# Patient Record
Sex: MALE | Race: WHITE | NOT HISPANIC OR LATINO | Employment: OTHER
[De-identification: names, ages, dates, MRNs, and addresses within clinical notes are randomized per-mention and may not be internally consistent; named-entity substitution may affect disease eponyms.]

---

## 2023-02-20 ENCOUNTER — TRANSCRIBE ORDERS (OUTPATIENT)
Dept: OTHER | Age: 68
End: 2023-02-20

## 2023-02-20 DIAGNOSIS — I25.10 CAD, MULTIPLE VESSEL: Primary | ICD-10-CM

## 2023-05-30 ENCOUNTER — HOSPITAL ENCOUNTER (INPATIENT)
Facility: CLINIC | Age: 68
LOS: 3 days | Discharge: HOME | DRG: 287 | End: 2023-06-02
Attending: EMERGENCY MEDICINE | Admitting: STUDENT IN AN ORGANIZED HEALTH CARE EDUCATION/TRAINING PROGRAM
Payer: COMMERCIAL

## 2023-05-30 ENCOUNTER — TRANSFERRED RECORDS (OUTPATIENT)
Dept: HEALTH INFORMATION MANAGEMENT | Facility: CLINIC | Age: 68
End: 2023-05-30

## 2023-05-30 ENCOUNTER — APPOINTMENT (OUTPATIENT)
Dept: GENERAL RADIOLOGY | Facility: CLINIC | Age: 68
DRG: 287 | End: 2023-05-30
Attending: EMERGENCY MEDICINE
Payer: COMMERCIAL

## 2023-05-30 DIAGNOSIS — R79.89 ELEVATED TROPONIN: ICD-10-CM

## 2023-05-30 DIAGNOSIS — R07.9 CHEST PAIN, UNSPECIFIED TYPE: ICD-10-CM

## 2023-05-30 DIAGNOSIS — I10 HYPERTENSION, UNSPECIFIED TYPE: ICD-10-CM

## 2023-05-30 DIAGNOSIS — I21.4 NSTEMI (NON-ST ELEVATED MYOCARDIAL INFARCTION) (H): Primary | ICD-10-CM

## 2023-05-30 LAB
ANION GAP SERPL CALCULATED.3IONS-SCNC: 14 MMOL/L (ref 7–15)
BASOPHILS # BLD AUTO: 0.1 10E3/UL (ref 0–0.2)
BASOPHILS NFR BLD AUTO: 1 %
BUN SERPL-MCNC: 24.8 MG/DL (ref 8–23)
CALCIUM SERPL-MCNC: 9.3 MG/DL (ref 8.8–10.2)
CHLORIDE SERPL-SCNC: 104 MMOL/L (ref 98–107)
CREAT SERPL-MCNC: 1.12 MG/DL (ref 0.67–1.17)
DEPRECATED HCO3 PLAS-SCNC: 21 MMOL/L (ref 22–29)
EOSINOPHIL # BLD AUTO: 0.1 10E3/UL (ref 0–0.7)
EOSINOPHIL NFR BLD AUTO: 1 %
ERYTHROCYTE [DISTWIDTH] IN BLOOD BY AUTOMATED COUNT: 15.1 % (ref 10–15)
ERYTHROCYTE [DISTWIDTH] IN BLOOD BY AUTOMATED COUNT: 15.2 % (ref 10–15)
GFR SERPL CREATININE-BSD FRML MDRD: 71 ML/MIN/1.73M2
GLUCOSE SERPL-MCNC: 81 MG/DL (ref 70–99)
HCT VFR BLD AUTO: 41.6 % (ref 40–53)
HCT VFR BLD AUTO: 43 % (ref 40–53)
HGB BLD-MCNC: 14.2 G/DL (ref 13.3–17.7)
HGB BLD-MCNC: 14.7 G/DL (ref 13.3–17.7)
HOLD SPECIMEN: NORMAL
IMM GRANULOCYTES # BLD: 0 10E3/UL
IMM GRANULOCYTES NFR BLD: 0 %
INR PPP: 1.56 (ref 0.85–1.15)
LYMPHOCYTES # BLD AUTO: 1.7 10E3/UL (ref 0.8–5.3)
LYMPHOCYTES NFR BLD AUTO: 21 %
MCH RBC QN AUTO: 29.4 PG (ref 26.5–33)
MCH RBC QN AUTO: 29.5 PG (ref 26.5–33)
MCHC RBC AUTO-ENTMCNC: 34.1 G/DL (ref 31.5–36.5)
MCHC RBC AUTO-ENTMCNC: 34.2 G/DL (ref 31.5–36.5)
MCV RBC AUTO: 86 FL (ref 78–100)
MCV RBC AUTO: 86 FL (ref 78–100)
MONOCYTES # BLD AUTO: 0.7 10E3/UL (ref 0–1.3)
MONOCYTES NFR BLD AUTO: 8 %
NEUTROPHILS # BLD AUTO: 5.4 10E3/UL (ref 1.6–8.3)
NEUTROPHILS NFR BLD AUTO: 69 %
NRBC # BLD AUTO: 0 10E3/UL
NRBC BLD AUTO-RTO: 0 /100
PLATELET # BLD AUTO: 250 10E3/UL (ref 150–450)
PLATELET # BLD AUTO: 257 10E3/UL (ref 150–450)
POTASSIUM SERPL-SCNC: 3.9 MMOL/L (ref 3.4–5.3)
RBC # BLD AUTO: 4.83 10E6/UL (ref 4.4–5.9)
RBC # BLD AUTO: 4.98 10E6/UL (ref 4.4–5.9)
SODIUM SERPL-SCNC: 139 MMOL/L (ref 136–145)
TROPONIN T SERPL HS-MCNC: 21 NG/L
TROPONIN T SERPL HS-MCNC: 23 NG/L
WBC # BLD AUTO: 6.8 10E3/UL (ref 4–11)
WBC # BLD AUTO: 7.9 10E3/UL (ref 4–11)

## 2023-05-30 PROCEDURE — 99285 EMERGENCY DEPT VISIT HI MDM: CPT | Mod: 25

## 2023-05-30 PROCEDURE — 85027 COMPLETE CBC AUTOMATED: CPT | Performed by: STUDENT IN AN ORGANIZED HEALTH CARE EDUCATION/TRAINING PROGRAM

## 2023-05-30 PROCEDURE — 36415 COLL VENOUS BLD VENIPUNCTURE: CPT | Performed by: STUDENT IN AN ORGANIZED HEALTH CARE EDUCATION/TRAINING PROGRAM

## 2023-05-30 PROCEDURE — 99223 1ST HOSP IP/OBS HIGH 75: CPT | Mod: AI | Performed by: STUDENT IN AN ORGANIZED HEALTH CARE EDUCATION/TRAINING PROGRAM

## 2023-05-30 PROCEDURE — 36415 COLL VENOUS BLD VENIPUNCTURE: CPT | Performed by: EMERGENCY MEDICINE

## 2023-05-30 PROCEDURE — 85025 COMPLETE CBC W/AUTO DIFF WBC: CPT | Performed by: EMERGENCY MEDICINE

## 2023-05-30 PROCEDURE — 83735 ASSAY OF MAGNESIUM: CPT | Performed by: STUDENT IN AN ORGANIZED HEALTH CARE EDUCATION/TRAINING PROGRAM

## 2023-05-30 PROCEDURE — 120N000001 HC R&B MED SURG/OB

## 2023-05-30 PROCEDURE — 71046 X-RAY EXAM CHEST 2 VIEWS: CPT

## 2023-05-30 PROCEDURE — 85610 PROTHROMBIN TIME: CPT | Performed by: EMERGENCY MEDICINE

## 2023-05-30 PROCEDURE — 250N000011 HC RX IP 250 OP 636: Performed by: STUDENT IN AN ORGANIZED HEALTH CARE EDUCATION/TRAINING PROGRAM

## 2023-05-30 PROCEDURE — 93005 ELECTROCARDIOGRAM TRACING: CPT

## 2023-05-30 PROCEDURE — 84484 ASSAY OF TROPONIN QUANT: CPT | Performed by: EMERGENCY MEDICINE

## 2023-05-30 PROCEDURE — 82310 ASSAY OF CALCIUM: CPT | Performed by: EMERGENCY MEDICINE

## 2023-05-30 PROCEDURE — 96365 THER/PROPH/DIAG IV INF INIT: CPT

## 2023-05-30 RX ORDER — SODIUM CHLORIDE 9 MG/ML
INJECTION, SOLUTION INTRAVENOUS CONTINUOUS
Status: DISCONTINUED | OUTPATIENT
Start: 2023-05-30 | End: 2023-06-01

## 2023-05-30 RX ORDER — CLOPIDOGREL BISULFATE 75 MG/1
75 TABLET ORAL EVERY EVENING
COMMUNITY
Start: 2023-04-27

## 2023-05-30 RX ORDER — HEPARIN SODIUM 10000 [USP'U]/100ML
0-5000 INJECTION, SOLUTION INTRAVENOUS CONTINUOUS
Status: DISCONTINUED | OUTPATIENT
Start: 2023-05-30 | End: 2023-06-01

## 2023-05-30 RX ORDER — ATORVASTATIN CALCIUM 40 MG/1
40 TABLET, FILM COATED ORAL EVERY EVENING
COMMUNITY
Start: 2023-03-10

## 2023-05-30 RX ORDER — NALOXONE HYDROCHLORIDE 0.4 MG/ML
0.2 INJECTION, SOLUTION INTRAMUSCULAR; INTRAVENOUS; SUBCUTANEOUS
Status: DISCONTINUED | OUTPATIENT
Start: 2023-05-30 | End: 2023-06-02 | Stop reason: HOSPADM

## 2023-05-30 RX ORDER — ESCITALOPRAM OXALATE 10 MG/1
20 TABLET ORAL EVERY EVENING
Status: DISCONTINUED | OUTPATIENT
Start: 2023-05-30 | End: 2023-06-02 | Stop reason: HOSPADM

## 2023-05-30 RX ORDER — AMOXICILLIN 250 MG
2 CAPSULE ORAL 2 TIMES DAILY PRN
Status: DISCONTINUED | OUTPATIENT
Start: 2023-05-30 | End: 2023-06-02 | Stop reason: HOSPADM

## 2023-05-30 RX ORDER — HYDROMORPHONE HCL IN WATER/PF 6 MG/30 ML
0.2 PATIENT CONTROLLED ANALGESIA SYRINGE INTRAVENOUS
Status: DISCONTINUED | OUTPATIENT
Start: 2023-05-30 | End: 2023-06-02 | Stop reason: HOSPADM

## 2023-05-30 RX ORDER — AMOXICILLIN 250 MG
1 CAPSULE ORAL 2 TIMES DAILY PRN
Status: DISCONTINUED | OUTPATIENT
Start: 2023-05-30 | End: 2023-06-02 | Stop reason: HOSPADM

## 2023-05-30 RX ORDER — NALOXONE HYDROCHLORIDE 0.4 MG/ML
0.4 INJECTION, SOLUTION INTRAMUSCULAR; INTRAVENOUS; SUBCUTANEOUS
Status: DISCONTINUED | OUTPATIENT
Start: 2023-05-30 | End: 2023-06-02 | Stop reason: HOSPADM

## 2023-05-30 RX ORDER — PROCHLORPERAZINE MALEATE 5 MG
5 TABLET ORAL EVERY 6 HOURS PRN
Status: DISCONTINUED | OUTPATIENT
Start: 2023-05-30 | End: 2023-06-02 | Stop reason: HOSPADM

## 2023-05-30 RX ORDER — PROCHLORPERAZINE 25 MG
12.5 SUPPOSITORY, RECTAL RECTAL EVERY 12 HOURS PRN
Status: DISCONTINUED | OUTPATIENT
Start: 2023-05-30 | End: 2023-06-02 | Stop reason: HOSPADM

## 2023-05-30 RX ORDER — ESCITALOPRAM OXALATE 20 MG/1
20 TABLET ORAL EVERY EVENING
COMMUNITY
Start: 2023-05-19

## 2023-05-30 RX ORDER — ACETAMINOPHEN 650 MG/1
650 SUPPOSITORY RECTAL EVERY 6 HOURS PRN
Status: DISCONTINUED | OUTPATIENT
Start: 2023-05-30 | End: 2023-06-02 | Stop reason: HOSPADM

## 2023-05-30 RX ORDER — CLOPIDOGREL BISULFATE 75 MG/1
75 TABLET ORAL EVERY EVENING
Status: DISCONTINUED | OUTPATIENT
Start: 2023-05-30 | End: 2023-06-02 | Stop reason: HOSPADM

## 2023-05-30 RX ORDER — ONDANSETRON 2 MG/ML
4 INJECTION INTRAMUSCULAR; INTRAVENOUS EVERY 6 HOURS PRN
Status: DISCONTINUED | OUTPATIENT
Start: 2023-05-30 | End: 2023-06-02 | Stop reason: HOSPADM

## 2023-05-30 RX ORDER — NORTRIPTYLINE HCL 10 MG
20-30 CAPSULE ORAL AT BEDTIME
Status: DISCONTINUED | OUTPATIENT
Start: 2023-05-30 | End: 2023-06-02 | Stop reason: HOSPADM

## 2023-05-30 RX ORDER — ONDANSETRON 4 MG/1
4 TABLET, ORALLY DISINTEGRATING ORAL EVERY 6 HOURS PRN
Status: DISCONTINUED | OUTPATIENT
Start: 2023-05-30 | End: 2023-06-02 | Stop reason: HOSPADM

## 2023-05-30 RX ORDER — ACETAMINOPHEN 325 MG/1
650 TABLET ORAL EVERY 6 HOURS PRN
Status: DISCONTINUED | OUTPATIENT
Start: 2023-05-30 | End: 2023-06-02 | Stop reason: HOSPADM

## 2023-05-30 RX ORDER — METOPROLOL SUCCINATE 25 MG/1
25 TABLET, EXTENDED RELEASE ORAL EVERY EVENING
Status: DISCONTINUED | OUTPATIENT
Start: 2023-05-30 | End: 2023-06-02 | Stop reason: HOSPADM

## 2023-05-30 RX ORDER — WARFARIN SODIUM 5 MG/1
5 TABLET ORAL EVERY EVENING
COMMUNITY
Start: 2023-03-09

## 2023-05-30 RX ORDER — ACETAMINOPHEN 500 MG
500-1000 TABLET ORAL EVERY 6 HOURS PRN
COMMUNITY

## 2023-05-30 RX ORDER — NORTRIPTYLINE HCL 10 MG
30 CAPSULE ORAL AT BEDTIME
COMMUNITY
Start: 2023-05-19

## 2023-05-30 RX ORDER — ATORVASTATIN CALCIUM 40 MG/1
40 TABLET, FILM COATED ORAL EVERY EVENING
Status: DISCONTINUED | OUTPATIENT
Start: 2023-05-30 | End: 2023-06-02 | Stop reason: HOSPADM

## 2023-05-30 RX ORDER — LIDOCAINE 40 MG/G
CREAM TOPICAL
Status: DISCONTINUED | OUTPATIENT
Start: 2023-05-30 | End: 2023-06-02 | Stop reason: HOSPADM

## 2023-05-30 RX ORDER — METOPROLOL SUCCINATE 25 MG/1
25 TABLET, EXTENDED RELEASE ORAL EVERY EVENING
COMMUNITY
Start: 2023-05-08

## 2023-05-30 RX ADMIN — HEPARIN SODIUM 1100 UNITS/HR: 10000 INJECTION, SOLUTION INTRAVENOUS at 20:30

## 2023-05-30 ASSESSMENT — ACTIVITIES OF DAILY LIVING (ADL)
ADLS_ACUITY_SCORE: 35
ADLS_ACUITY_SCORE: 25
ADLS_ACUITY_SCORE: 35
ADLS_ACUITY_SCORE: 35

## 2023-05-30 NOTE — Clinical Note
The DP pulses are 2+ bilaterally. The PT pulses are 2+ bilaterally. The femoral pulses are 2+ bilaterally.

## 2023-05-30 NOTE — ED TRIAGE NOTES
Arrives via EMS for chest pain that started two hours PTA when putting in an AC unit. Recently had two stents placed two months ago. H/o afib, on thinners. Denies CP currently. Sent from the clinic. EKG from clinic NSR. VSS. ABCs intact. A/Ox4.

## 2023-05-30 NOTE — ED PROVIDER NOTES
History   Chief Complaint:  Chest Pain    HPI   José Antonio Marcano is a 68 year old male with history of lymphoblastic lymphoma, hypertension, hyperlipidemia, coronary artery disease four months s/p angiogram with PCI who presents to the emergency department for evaluation of chest pain onset two hours ago(1500). José Antonio explains that he was putting in an AC unit when he experienced sudden-onset chest pain. This episode of chest pain lasted for approximately one hour, which is longer than any previous episodes of chest pain. His pain his presently relieved. He did not take his nitroglycerin as he forgot it at home. Neither nitroglycerin nor aspirin [place] [facility] [place] or while en route. Denies headache, dyspnea beyond baseline, and upper or lower extremity abnormalities. Primary cardiologist is Dr. Bekah Ochoa MD, at the [facility]. He takes all his meds including cardiac daily and missed no medications today. José Antonio's primary occupation is in [employer].     Independent Historian:   None - Patient Only    Medications:    Coumadin   Plavix 02/17/2023-04/24/2023  Lovenox 02/17/2023-04/24/2023  Aspirin 81 MG  Atorvastatin   Lexapro  Metoprolol   Nitroglycerin   Nortriptyline     Past Medical History:    Hypertension  Hyperlipidemia  Inguinal hernia  Lymphoblastic lymphoma   TIA  Atrial fibrillation   Coronary artery disease     Past Surgical History:    Medtronic LINQ insertion   LUIS x 2 to LM-LAD and LM-Cx  PCI  Coronary angiogram     Physical Exam     Patient Vitals for the past 24 hrs:   BP Temp Temp src Pulse Resp SpO2   05/30/23 1901 (!) 139/92 -- -- 73 12 98 %   05/30/23 1741 -- -- -- 69 11 98 %   05/30/23 1644 (!) 133/100 -- -- 74 17 96 %   05/30/23 1610 (!) 138/102 -- -- 89 13 100 %   05/30/23 1555 (!) 167/117 98.1  F (36.7  C) Temporal 80 18 100 %     Physical Exam  Constitutional: Patient is well appearing. No distress.  Head: Atraumatic.  Eyes: Conjunctivae are  normal. No scleral icterus.  Neck: Normal range of motion. Neck supple.   Cardiovascular: Normal rate, regular rhythm, normal heart sounds and intact distal perfusion.   Pulmonary/Chest: Breath sounds normal. No respiratory distress.  Abdominal: Soft. Bowel sounds are normal. No distension. No tenderness. No rebound or guarding.   Musculoskeletal: Normal range of motion. No edema or tenderness.   Neurological: Alert and orientated to person, place, and time. No observable focal neuro deficit  Skin: Warm and dry. No rash noted. Not diaphoretic.     Emergency Department Course   ECG  ECG taken at 1601, ECG read at 1613  Sinus rhythm with marked sinus arrhythmia   Nonspecific ST abnormality   Prolonged QT   Abnormal ECG   Rate 85 bpm. SD interval 116 ms. QRS duration 96 ms. QT/QTc 412/490 ms. P-R-T axes 42 -17 59.     Imaging:  XR Chest 2 Views   Final Result   IMPRESSION:       The cardiac silhouette is normal in size. Left coronary stent. Loop recorder in the left anterior chest wall. Hilar and mediastinal interfaces are normal.      The lungs are symmetrically inflated. There are no airspace or interstitial opacities.      Diaphragm curvature is normal. No pleural fluid is present.      Flowing degenerative osteophytes mid and lower thoracic spine. No aggressive or destructive bone lesions.             Report per radiology    Laboratory:  Labs Ordered and Resulted from Time of ED Arrival to Time of ED Departure   BASIC METABOLIC PANEL - Abnormal       Result Value    Sodium 139      Potassium 3.9      Chloride 104      Carbon Dioxide (CO2) 21 (*)     Anion Gap 14      Urea Nitrogen 24.8 (*)     Creatinine 1.12      Calcium 9.3      Glucose 81      GFR Estimate 71     CBC WITH PLATELETS AND DIFFERENTIAL - Abnormal    WBC Count 7.9      RBC Count 4.98      Hemoglobin 14.7      Hematocrit 43.0      MCV 86      MCH 29.5      MCHC 34.2      RDW 15.1 (*)     Platelet Count 257      % Neutrophils 69      % Lymphocytes 21       % Monocytes 8      % Eosinophils 1      % Basophils 1      % Immature Granulocytes 0      NRBCs per 100 WBC 0      Absolute Neutrophils 5.4      Absolute Lymphocytes 1.7      Absolute Monocytes 0.7      Absolute Eosinophils 0.1      Absolute Basophils 0.1      Absolute Immature Granulocytes 0.0      Absolute NRBCs 0.0     INR - Abnormal    INR 1.56 (*)    TROPONIN T, HIGH SENSITIVITY - Abnormal    Troponin T, High Sensitivity 23 (*)    TROPONIN T, HIGH SENSITIVITY - Normal    Troponin T, High Sensitivity 21       Emergency Department Course & Assessments:    Interventions:  Medications - No data to display     Assessments:  1622 I obtained history and examined the patient as noted above.   1917 I rechecked the patient and explained findings. I discussed admission with the patient. All questions answered.     Independent Interpretation (X-rays, CTs, rhythm strip):  None    Consultations/Discussion of Management or Tests:  ED Course as of 05/30/23 1934   Tue May 30, 2023   1926 I consulted with Dr. Monsalve, hospitalist, regarding the patient's history and presentation here in the emergency department who accepted the patient for admission.     Social Determinants of Health affecting care:   None    Disposition:  The patient was admitted to the hospital under the care of Dr. Monsalve .     Impression & Plan      Medical Decision Making:  This patient presents to the emergency department today with chest pain. Initial imaging tests has come back normal. Delta trop was positive. INR is not within therapeutic ranges. My clinical suspicion of acute coronary syndrome is high enough to warrant further therapy and investigation. I will admit the patient to medicine service for further workup. The patient is pain free upon emergency department arrival. There is no clinical, laboratory, or radiographic evidence of pulmonary embolism, AAA, aortic dissection, pneumonia or pneumothorax. The patient agrees to be admitted and all  questions were answered.      Diagnosis:    ICD-10-CM    1. Chest pain, unspecified type  R07.9       2. Elevated troponin  R77.8         Scribe Disclosure:  I, Erlinda Lam, am serving as a scribe at 4:21 PM on 5/30/2023 to document services personally performed by Yury Moses MD based on my observations and the provider's statements to me.    5/30/2023   Yury Moses MD Stevens, Andrew C, MD  05/30/23 2012

## 2023-05-31 ENCOUNTER — APPOINTMENT (OUTPATIENT)
Dept: CARDIOLOGY | Facility: CLINIC | Age: 68
DRG: 287 | End: 2023-05-31
Attending: STUDENT IN AN ORGANIZED HEALTH CARE EDUCATION/TRAINING PROGRAM
Payer: COMMERCIAL

## 2023-05-31 LAB
ANION GAP SERPL CALCULATED.3IONS-SCNC: 11 MMOL/L (ref 7–15)
ATRIAL RATE - MUSE: 54 BPM
ATRIAL RATE - MUSE: 85 BPM
BI-PLANE LVEF ECHO: NORMAL
BUN SERPL-MCNC: 23.1 MG/DL (ref 8–23)
CALCIUM SERPL-MCNC: 9.2 MG/DL (ref 8.8–10.2)
CHLORIDE SERPL-SCNC: 104 MMOL/L (ref 98–107)
CHOLEST SERPL-MCNC: 287 MG/DL
CREAT SERPL-MCNC: 0.93 MG/DL (ref 0.67–1.17)
DEPRECATED HCO3 PLAS-SCNC: 23 MMOL/L (ref 22–29)
DIASTOLIC BLOOD PRESSURE - MUSE: NORMAL MMHG
DIASTOLIC BLOOD PRESSURE - MUSE: NORMAL MMHG
ERYTHROCYTE [DISTWIDTH] IN BLOOD BY AUTOMATED COUNT: 15.5 % (ref 10–15)
GFR SERPL CREATININE-BSD FRML MDRD: 88 ML/MIN/1.73M2
GLUCOSE BLDC GLUCOMTR-MCNC: 84 MG/DL (ref 70–99)
GLUCOSE SERPL-MCNC: 103 MG/DL (ref 70–99)
HCT VFR BLD AUTO: 43.2 % (ref 40–53)
HDLC SERPL-MCNC: 68 MG/DL
HGB BLD-MCNC: 14.4 G/DL (ref 13.3–17.7)
INTERPRETATION ECG - MUSE: NORMAL
INTERPRETATION ECG - MUSE: NORMAL
LDLC SERPL CALC-MCNC: 198 MG/DL
MAGNESIUM SERPL-MCNC: 2 MG/DL (ref 1.7–2.3)
MAGNESIUM SERPL-MCNC: 2.1 MG/DL (ref 1.7–2.3)
MCH RBC QN AUTO: 29.6 PG (ref 26.5–33)
MCHC RBC AUTO-ENTMCNC: 33.3 G/DL (ref 31.5–36.5)
MCV RBC AUTO: 89 FL (ref 78–100)
NONHDLC SERPL-MCNC: 219 MG/DL
P AXIS - MUSE: 42 DEGREES
P AXIS - MUSE: 50 DEGREES
PLATELET # BLD AUTO: 229 10E3/UL (ref 150–450)
POTASSIUM SERPL-SCNC: 4.2 MMOL/L (ref 3.4–5.3)
PR INTERVAL - MUSE: 116 MS
PR INTERVAL - MUSE: 150 MS
QRS DURATION - MUSE: 96 MS
QRS DURATION - MUSE: 96 MS
QT - MUSE: 412 MS
QT - MUSE: 490 MS
QTC - MUSE: 464 MS
QTC - MUSE: 490 MS
R AXIS - MUSE: -17 DEGREES
R AXIS - MUSE: -19 DEGREES
RBC # BLD AUTO: 4.86 10E6/UL (ref 4.4–5.9)
SODIUM SERPL-SCNC: 138 MMOL/L (ref 136–145)
SYSTOLIC BLOOD PRESSURE - MUSE: NORMAL MMHG
SYSTOLIC BLOOD PRESSURE - MUSE: NORMAL MMHG
T AXIS - MUSE: 47 DEGREES
T AXIS - MUSE: 59 DEGREES
TRIGL SERPL-MCNC: 105 MG/DL
TROPONIN T SERPL HS-MCNC: 23 NG/L
TROPONIN T SERPL HS-MCNC: 25 NG/L
UFH PPP CHRO-ACNC: 0.16 IU/ML
UFH PPP CHRO-ACNC: 0.28 IU/ML
UFH PPP CHRO-ACNC: 0.37 IU/ML
UFH PPP CHRO-ACNC: 0.52 IU/ML
VENTRICULAR RATE- MUSE: 54 BPM
VENTRICULAR RATE- MUSE: 85 BPM
WBC # BLD AUTO: 5 10E3/UL (ref 4–11)

## 2023-05-31 PROCEDURE — 36415 COLL VENOUS BLD VENIPUNCTURE: CPT | Performed by: HOSPITALIST

## 2023-05-31 PROCEDURE — 250N000011 HC RX IP 250 OP 636: Performed by: STUDENT IN AN ORGANIZED HEALTH CARE EDUCATION/TRAINING PROGRAM

## 2023-05-31 PROCEDURE — 80061 LIPID PANEL: CPT | Performed by: STUDENT IN AN ORGANIZED HEALTH CARE EDUCATION/TRAINING PROGRAM

## 2023-05-31 PROCEDURE — 85027 COMPLETE CBC AUTOMATED: CPT | Performed by: STUDENT IN AN ORGANIZED HEALTH CARE EDUCATION/TRAINING PROGRAM

## 2023-05-31 PROCEDURE — 80048 BASIC METABOLIC PNL TOTAL CA: CPT | Performed by: STUDENT IN AN ORGANIZED HEALTH CARE EDUCATION/TRAINING PROGRAM

## 2023-05-31 PROCEDURE — 120N000001 HC R&B MED SURG/OB

## 2023-05-31 PROCEDURE — 250N000013 HC RX MED GY IP 250 OP 250 PS 637

## 2023-05-31 PROCEDURE — 99233 SBSQ HOSP IP/OBS HIGH 50: CPT | Performed by: HOSPITALIST

## 2023-05-31 PROCEDURE — 250N000013 HC RX MED GY IP 250 OP 250 PS 637: Performed by: STUDENT IN AN ORGANIZED HEALTH CARE EDUCATION/TRAINING PROGRAM

## 2023-05-31 PROCEDURE — 999N000208 ECHOCARDIOGRAM COMPLETE

## 2023-05-31 PROCEDURE — 83735 ASSAY OF MAGNESIUM: CPT | Performed by: STUDENT IN AN ORGANIZED HEALTH CARE EDUCATION/TRAINING PROGRAM

## 2023-05-31 PROCEDURE — 85520 HEPARIN ASSAY: CPT | Performed by: STUDENT IN AN ORGANIZED HEALTH CARE EDUCATION/TRAINING PROGRAM

## 2023-05-31 PROCEDURE — 36415 COLL VENOUS BLD VENIPUNCTURE: CPT | Performed by: STUDENT IN AN ORGANIZED HEALTH CARE EDUCATION/TRAINING PROGRAM

## 2023-05-31 PROCEDURE — 84484 ASSAY OF TROPONIN QUANT: CPT | Performed by: STUDENT IN AN ORGANIZED HEALTH CARE EDUCATION/TRAINING PROGRAM

## 2023-05-31 PROCEDURE — 250N000013 HC RX MED GY IP 250 OP 250 PS 637: Performed by: INTERNAL MEDICINE

## 2023-05-31 PROCEDURE — 93306 TTE W/DOPPLER COMPLETE: CPT | Mod: 26 | Performed by: INTERNAL MEDICINE

## 2023-05-31 PROCEDURE — 99223 1ST HOSP IP/OBS HIGH 75: CPT | Mod: 25 | Performed by: INTERNAL MEDICINE

## 2023-05-31 PROCEDURE — 93010 ELECTROCARDIOGRAM REPORT: CPT | Performed by: INTERNAL MEDICINE

## 2023-05-31 PROCEDURE — 85520 HEPARIN ASSAY: CPT | Performed by: HOSPITALIST

## 2023-05-31 PROCEDURE — 258N000003 HC RX IP 258 OP 636: Performed by: STUDENT IN AN ORGANIZED HEALTH CARE EDUCATION/TRAINING PROGRAM

## 2023-05-31 PROCEDURE — 255N000002 HC RX 255 OP 636: Performed by: STUDENT IN AN ORGANIZED HEALTH CARE EDUCATION/TRAINING PROGRAM

## 2023-05-31 RX ORDER — LOSARTAN POTASSIUM 25 MG/1
25 TABLET ORAL DAILY
Status: DISCONTINUED | OUTPATIENT
Start: 2023-05-31 | End: 2023-06-02 | Stop reason: HOSPADM

## 2023-05-31 RX ORDER — ASPIRIN 325 MG
325 TABLET ORAL ONCE
Status: COMPLETED | OUTPATIENT
Start: 2023-05-31 | End: 2023-05-31

## 2023-05-31 RX ORDER — LIDOCAINE 40 MG/G
CREAM TOPICAL
Status: DISCONTINUED | OUTPATIENT
Start: 2023-05-31 | End: 2023-06-01 | Stop reason: HOSPADM

## 2023-05-31 RX ORDER — LORAZEPAM 0.5 MG/1
TABLET ORAL
Status: COMPLETED
Start: 2023-05-31 | End: 2023-05-31

## 2023-05-31 RX ORDER — ASPIRIN 81 MG/1
243 TABLET, CHEWABLE ORAL ONCE
Status: COMPLETED | OUTPATIENT
Start: 2023-05-31 | End: 2023-05-31

## 2023-05-31 RX ORDER — LORAZEPAM 0.5 MG/1
0.5 TABLET ORAL
Status: DISCONTINUED | OUTPATIENT
Start: 2023-05-31 | End: 2023-06-01 | Stop reason: HOSPADM

## 2023-05-31 RX ORDER — LORAZEPAM 2 MG/ML
0.5 INJECTION INTRAMUSCULAR
Status: DISCONTINUED | OUTPATIENT
Start: 2023-05-31 | End: 2023-06-01 | Stop reason: HOSPADM

## 2023-05-31 RX ORDER — POTASSIUM CHLORIDE 1500 MG/1
20 TABLET, EXTENDED RELEASE ORAL
Status: DISCONTINUED | OUTPATIENT
Start: 2023-05-31 | End: 2023-06-01 | Stop reason: HOSPADM

## 2023-05-31 RX ADMIN — SODIUM CHLORIDE: 9 INJECTION, SOLUTION INTRAVENOUS at 14:40

## 2023-05-31 RX ADMIN — CLOPIDOGREL BISULFATE 75 MG: 75 TABLET ORAL at 20:06

## 2023-05-31 RX ADMIN — LORAZEPAM 0.5 MG: 0.5 TABLET ORAL at 16:21

## 2023-05-31 RX ADMIN — HEPARIN SODIUM 900 UNITS/HR: 10000 INJECTION, SOLUTION INTRAVENOUS at 16:38

## 2023-05-31 RX ADMIN — ASPIRIN 325 MG ORAL TABLET 325 MG: 325 PILL ORAL at 13:38

## 2023-05-31 RX ADMIN — HEPARIN SODIUM 1100 UNITS/HR: 10000 INJECTION, SOLUTION INTRAVENOUS at 13:41

## 2023-05-31 RX ADMIN — ESCITALOPRAM OXALATE 20 MG: 10 TABLET ORAL at 20:06

## 2023-05-31 RX ADMIN — ATORVASTATIN CALCIUM 40 MG: 40 TABLET, FILM COATED ORAL at 20:06

## 2023-05-31 RX ADMIN — CLOPIDOGREL BISULFATE 75 MG: 75 TABLET ORAL at 01:08

## 2023-05-31 RX ADMIN — ESCITALOPRAM OXALATE 20 MG: 10 TABLET ORAL at 01:07

## 2023-05-31 RX ADMIN — HEPARIN SODIUM 1050 UNITS/HR: 10000 INJECTION, SOLUTION INTRAVENOUS at 22:45

## 2023-05-31 RX ADMIN — METOPROLOL SUCCINATE 25 MG: 25 TABLET, EXTENDED RELEASE ORAL at 01:08

## 2023-05-31 RX ADMIN — NORTRIPTYLINE HYDROCHLORIDE 20 MG: 10 CAPSULE ORAL at 20:06

## 2023-05-31 RX ADMIN — NORTRIPTYLINE HYDROCHLORIDE 20 MG: 10 CAPSULE ORAL at 01:07

## 2023-05-31 RX ADMIN — ATORVASTATIN CALCIUM 40 MG: 40 TABLET, FILM COATED ORAL at 01:08

## 2023-05-31 RX ADMIN — HUMAN ALBUMIN MICROSPHERES AND PERFLUTREN 4 ML: 10; .22 INJECTION, SOLUTION INTRAVENOUS at 09:47

## 2023-05-31 ASSESSMENT — ACTIVITIES OF DAILY LIVING (ADL)
ADLS_ACUITY_SCORE: 25

## 2023-05-31 NOTE — PLAN OF CARE
Care from [id number]      Admit Date: 5/30/23  Admitting Diagnosis: elevated trop, chest pain  Pertinent History:     Isolation Precautions:   none.    Neuro: Alert and Oriented x4  Activity: are SBA with IV pole  Telemetry Monitoring: Yes - bradycardic  Pain: denying pain and chest pain.  Labs / Tests: troponin: 23, 25. Hep Xa: 0200: 0.28 no adjustment needed. Recheck scheduled for 0846.  GI: Bowel sounds audible.  : Voids spontaneously ambulating to the bathroom  LDA's: Peripheral x2  Fluids: left PIV Infusing. Right PIV SL  Diet: NPO  Consults: Cardiology  Discharge Disposition: TBD

## 2023-05-31 NOTE — H&P
[facility]    History and Physical - Hospitalist Service       Date of Admission:  5/30/2023    Assessment & Plan      José Antonio Marcano is a 68 year old male admitted on 5/30/2023.    68 y.o. male with a history of CAD, uncontrolled dyslipidemia, multiple CVA (on warfarin), paroxysmal atrial fibrillation, aortic valve sclerosis who underwent staged PCI on 2/17/2023 with LUIS x 2 to LM-LAD and LM-Cx.    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  He did not have his nitroglycerin with him.  It is not associated with any nausea, vomiting, diaphoresis or shortness of breath.  He went to see his doctor and was transferred to the ER by ambulance.  Currently he is chest pain-free.    Vitals on presentation: Temperature 98.1, heart rate of 80, blood pressure 167/117, respiratory rate of 18 and oxygen saturation of 100%. EKG showed sinus rhythm with sinus arrhythmia, first troponin was 21 and on recheck it was 23.  Rest of the CBC and BMP were unremarkable.  INR was 1.56.      1. Chest pain    Given high risk patient with recent PCI, concerning chest pain and slight uptrend in troponin, will treat as NSTEMI with heparin drip.  Consult cardiology in the morning.    Continue Plavix, not on aspirin due to being on Coumadin.    2. History of atrial fibrillation.    Currently in sinus rhythm with sinus arrhythmia.    INR subtherapeutic at 1.56, will be on heparin drip.    3. Essential hypertension.    Continue metoprolol.    4. Uncontrolled hyperlipidemia.    Continue Lipitor.  He has been prescribed Repatha but has not been taking it because he could not afford it.    Check lipid panel in the morning.    5. Depression/anxiety    Continue nortriptyline and escitalopram.     Diet:  Cardiac diet, n.p.o. at midnight  DVT Prophylaxis: Heparin drip  K[place]land Catheter: Not present  Lines: None     Cardiac Monitoring: None  Code Status:    full code    Clinically Significant Risk Factors Present on Admission               # Coagulation Defect: INR = 1.56 (Ref range: 0.85 - 1.15) and/or PTT = N/A, will monitor for bleeding    # Hypertension: Noted on problem list               Disposition Plan      Expected Discharge Date: 06/01/2023                  Rojelio Monsalve MD  Hospitalist Service  [facility]  Securely message with [employer] (more info)  Text page via [facility] Paging/Directory     ______________________________________________________________________    Chief Complaint   CP    History is obtained from the patient    History of Present Illness   José Antonio Marcano is a 68 year old male admitted on 5/30/2023.    68 y.o. male with a history of CAD, uncontrolled dyslipidemia, multiple CVA (on warfarin), paroxysmal atrial fibrillation, aortic valve sclerosis who underwent staged PCI on 2/17/2023 with LUIS x 2 to LM-LAD and LM-Cx.    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  He did not have his nitroglycerin with him.  It is not associated with any nausea, vomiting, diaphoresis or shortness of breath.  He went to see his doctor and was transferred to the ER by ambulance.  Currently he is chest pain-free.    Vitals on presentation: Temperature 98.1, heart rate of 80, blood pressure 167/117, respiratory rate of 18 and oxygen saturation of 100%. EKG showed sinus rhythm with sinus arrhythmia, first troponin was 21 and on recheck it was 23.  Rest of the CBC and BMP were unremarkable.  INR was 1.56.      Past Medical History    Past Medical History:   Diagnosis Date     HTN (Hypertension)      Hyperlipidemia LDL Goal <130      Inguinal Hernia      Lymphoma, Lymphoblastic (H)     had chemo       Past Surgical History   No past surgical history on file.    Prior to Admission Medications   None      Physical Exam   Vital Signs: Temp: 98.1  F  (36.7  C) Temp src: Temporal BP: (!) 139/92 Pulse: 73   Resp: 12 SpO2: 98 % O2 Device: None (Room air)    Weight: 0 lbs 0 oz        Medical Decision Making       MANAGEMENT DISCUSSED with the following over the past 24 hours: Patient, his wife and ER provider.       Data   Imaging results reviewed over the past 24 hrs:   Recent Results (from the past 24 hour(s))   XR Chest 2 Views    Narrative    EXAM: XR CHEST 2 VIEWS  LOCATION: [facility]  DATE/TIME: 5/30/2023 4:42 PM CDT    INDICATION: Chest pain  COMPARISON: None.      Impression    IMPRESSION:     The cardiac silhouette is normal in size. Left coronary stent. Loop recorder in the left anterior chest wall. Hilar and mediastinal interfaces are normal.    The lungs are symmetrically inflated. There are no airspace or interstitial opacities.    Diaphragm curvature is normal. No pleural fluid is present.    Flowing degenerative osteophytes mid and lower thoracic spine. No aggressive or destructive bone lesions.        Recent Labs   Lab 05/30/23  2018 05/30/23  1602   WBC 6.8 7.9   HGB 14.2 14.7   MCV 86 86    257   INR  --  1.56*   NA  --  139   POTASSIUM  --  3.9   CHLORIDE  --  104   CO2  --  21*   BUN  --  24.8*   CR  --  1.12   ANIONGAP  --  14   NOEL  --  9.3   GLC  --  81

## 2023-05-31 NOTE — CONSULTS
[facility]    Cardiology Consultation     José Antonio Marcano MRN#: [id number]   YOB: 1955 Age: 68 year old     Date of Admission:  5/30/2023    Consult Indication:  Elevated troponin    Assessment & Plan     # NSTEMI, troponin mildly elevated, known CAD with complex PCI with bifurcation stenting in LM to LAD/LCx, known RCA . Clinical presentation not consistent with PE or acute aortic syndrome, has known moderate ascending aortic dilatation 4.5cm, BP left arm 122/81 mmHg, right arm 116/79 mmHg, chest pain quality is same as prior anginal pain. Ddx includes demand ischemia from known CAD with RCA .   # HFrEF, LVEF 44% with apical hypokinesis. Euvolemic.   # PAF  # Amish status, no blood transfusions    - Discussed options for further evaluation and management including conservative medical management with close follow up, non-invasive stress testing, or cardiac catheterization/coronary angiography.  Reviewed the risks and benefits of each option at length.   - Recommend  cardiac catheterization/coronary angiography. If PCI needed, recommend transfer to [facility] or [place].   - Discussed the risks of cardiac catheterization/angiography +/- PCI that include but are not limited to the risk of stroke, heart attack, death, cardiac injury, emergent intervention such as stenting or bypass, contrast induced allergic reaction, renal dysfunction (including risks of temporary or permanent dialysis), and complications related to sedation and respiratory/pulmonary compromise, vascular complications (including bleeding and blood transfusion). Patient denies any major active bleeding issues and is willing to take and comply with dual antiplatelet therapy and understands the associated bleeding risks. Discussed bleeding risk in relation to Amish status increases overall procedural risk of morbidity/mortality.  Patient understands the overall risks of the procedure and wishes  "to proceed.  - Discussed with Pt's outpatient cardiologist, Dr. Ochoa, who was in agreement with diagnostic coronary angiogram  - continue home regimen of clopidogrel, metoprolol succinate, atorvastatin, warfarin (INR 1.56)  - cardiology will follow    Please do not hesitate to page with any questions or concerns.     Manuel Sahni MD, [facility]  Cardiology  May 31, 2023    Voice recognition software utilized.   High complexity     History of Present Illness     Patient is a 68-year-old male with a past medical history significant for dyslipidemia, multiple CVA (on warfarin), paroxysmal atrial fibrillation, coronary artery disease with complex PCI, who presents with chest pain.    Patient is followed by Dr. Oleary with [facility].  Patient has been experiencing exertional angina and on 1/27/2023 underwent cardiac catheterization/coronary angiography which demonstrated multivessel coronary artery disease with moderate left main disease, moderate to significant proximal left circumflex disease, severe LAD disease,  of RCA.  CABG was offered and was declined, patient is a [employer], there were concerns regarding blood loss from surgery.  On 2/17/2023 patient underwent complex PCI with complex bifurcation stenting of the left main into LAD and left main into the left circumflex artery.     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 This was similar to his pain leading up to his angiogram and stenting 1/2023. This pain lasted for about 30 minutes before subsiding. Denies radiation to his shoulder blades, no \"tearing\" sensation. He presented to the ED for further evaluation/management.     In the ED initial blood pressure was 167/117 mmHg.  ECG demonstrates sinus rhythm at 85 bpm with sinus arrhythmia, nonspecific ST changes, QTc 490 ms.  Initial labs notable for potassium 3.9, creatinine 1.12, hemoglobin 14.7.  INR 1.56.  " Initial troponin 21, peaked at 25, last value 23.    Last TTE through [facility] 4/17/2023 reported LVEF 50 to 55%, normal RV function, no significant valvular abnormalities.    TTE 5/31/2023 LVEF 44% with apical anterior, apical septal, apex hypokinesis. Ascending aorta 4.5cm, stable from prior reports.       Past Medical History   Past Medical History:   Diagnosis Date     HTN (Hypertension)      Hyperlipidemia LDL Goal <130      Inguinal Hernia      Lymphoma, Lymphoblastic (H)     had chemo       Past Surgical History   No past surgical history on file.    Prior to Admission Medications   Prior to Admission Medications   Prescriptions Last Dose Informant Patient Reported? Taking?   acetaminophen (TYLENOL) 500 MG tablet prn at prn  Yes Yes   Sig: Take 500-1,000 mg by mouth every 6 hours as needed for mild pain   atorvastatin (LIPITOR) 40 MG tablet 5/29/2023 at pm  Yes Yes   Sig: Take 40 mg by mouth every evening   clopidogrel (PLAVIX) 75 MG tablet 5/29/2023 at pm  Yes Yes   Sig: Take 75 mg by mouth every evening   escitalopram (LEXAPRO) 20 MG tablet 5/29/2023 at pm  Yes Yes   Sig: Take 20 mg by mouth every evening   metoprolol succinate ER (TOPROL XL) 25 MG 24 hr tablet 5/29/2023 at pm  Yes Yes   Sig: Take 25 mg by mouth every evening   nortriptyline (PAMELOR) 10 MG capsule 5/29/2023 at hs  Yes Yes   Sig: Take 30 mg by mouth At Bedtime   warfarin ANTICOAGULANT (COUMADIN) 5 MG tablet 5/29/2023 at pm  Yes Yes   Sig: Take 5 mg by mouth every evening      Facility-Administered Medications: None     Current Facility-Administered Medications   Medication Dose Route Frequency     atorvastatin  40 mg Oral QPM     clopidogrel  75 mg Oral QPM     escitalopram  20 mg Oral QPM     metoprolol succinate ER  25 mg Oral QPM     nortriptyline  20-30 mg Oral At Bedtime     sodium chloride (PF)  3 mL Intracatheter Q8H     Current Facility-Administered Medications   Medication Last Rate     heparin 1,100 Units/hr (05/31/23 0736)     -  MEDICATION INSTRUCTIONS -       sodium chloride       Allergies   No Known Allergies    Social History    reports that he has been smoking cigarettes. He has been smoking an average of .5 packs per day. He does not have any smokeless tobacco history on file. He reports current alcohol use.           Review of Systems   A comprehensive review of system was performed and is negative other than that noted in the HPI or here.     Physical Exam   Vital Signs with Ranges  Temp:  [97.9  F (36.6  C)-98.3  F (36.8  C)] 97.9  F (36.6  C)  Pulse:  [] 60  Resp:  [11-18] 18  BP: (125-167)/() 125/73  SpO2:  [95 %-100 %] 95 %  Wt Readings from Last 4 Encounters:   23 91.1 kg (200 lb 13.4 oz)   09 81.3 kg (179 lb 4.8 oz)     No intake/output data recorded.    Vital signs were personally reviewed:  Temperatures:  Current - Temp: 97.9  F (36.6  C); Max - Temp  Av.1  F (36.7  C)  Min: 97.9  F (36.6  C)  Max: 98.3  F (36.8  C)  Respiration range: Resp  Avg: 15.5  Min: 11  Max: 18  Pulse range: Pulse  Av.3  Min: 60  Max: 114  Blood pressure range: Systolic (24hrs), Av , Min:125 , Max:167   ; Diastolic (24hrs), Av, Min:73, Max:117    Pulse oximetry range: SpO2  Av.2 %  Min: 95 %  Max: 100 %  No intake or output data in the 24 hours ending 23 0846  200 lbs 13.42 oz  Body mass index is 27.24 kg/m .   Body surface area is 2.15 meters squared.    Physical Exam:   General/Constitutional: appears stated age, in no apparent distress, appears to be well nourished  Head: normocephalic, atraumatic  Eyes - No scleral icterus  Neck: supple, trachea midline  Respiratory: clear to auscultation bilaterally, no wheezes, no rales, no increased work of breathing  Cardiovascular: JVP normal, regular rate, regular rhythm, normal S1 and S2, no S3, S4, 1/6 RAYNA at the RUSB, no lower extremity edema  Gastrointestinal: no guarding, non-rigid   Neurologic: awake, alert, moves all extremities  Skin: no  jaundice, warm on limited exam  Psychiatric: affect is normal, answers questions appropriately, oriented to self and place    Laboratory tests personally reviewed:   CMP  Recent Labs   Lab 05/31/23  0648 05/30/23  1828 05/30/23  1602     --  139   POTASSIUM 4.2  --  3.9   CHLORIDE 104  --  104   CO2 23  --  21*   ANIONGAP 11  --  14   *  --  81   BUN 23.1*  --  24.8*   CR 0.93  --  1.12   GFRESTIMATED 88  --  71   NOEL 9.2  --  9.3   MAG 2.1 2.0  --      CBC  Recent Labs   Lab 05/31/23  0648 05/30/23 2018 05/30/23  1602   WBC 5.0 6.8 7.9   RBC 4.86 4.83 4.98   HGB 14.4 14.2 14.7   HCT 43.2 41.6 43.0   MCV 89 86 86   MCH 29.6 29.4 29.5   MCHC 33.3 34.1 34.2   RDW 15.5* 15.2* 15.1*    250 257     INR  Recent Labs   Lab 05/30/23  1602   INR 1.56*   Clinically Significant Risk Factors Present on Admission               # Drug Induced Coagulation Defect: home medication list includes an anticoagulant medication  # Drug Induced Platelet Defect: home medication list includes an antiplatelet medication   # Hypertension: Noted on problem list      # Overweight: Estimated body mass index is 27.24 kg/m  as calculated from the following:    Height as of this encounter: 1.829 m (6').    Weight as of this encounter: 91.1 kg (200 lb 13.4 oz).

## 2023-05-31 NOTE — PROGRESS NOTES
[facility]    Hospitalist Progress Note    Date of Service (when I saw the patient): 05/31/2023  Provider:  Kaiser Hoyos MD   Text Page  7am - 6PM       Assessment & Plan   José Antonio Marcano is a 68 year old male admitted on 5/30/2023.     68 y.o. male with a history of CAD, uncontrolled dyslipidemia, multiple CVA (on warfarin), paroxysmal atrial fibrillation, aortic valve sclerosis who underwent staged PCI on 2/17/2023 with LUIS x 2 to LM-LAD and LM-Cx.     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  He did not have his nitroglycerin with him.  It is not associated with any nausea, vomiting, diaphoresis or shortness of breath.  He went to see his doctor and was transferred to the ER by ambulance.  Currently he is chest pain-free.     Vitals on presentation: Temperature 98.1, heart rate of 80, blood pressure 167/117, respiratory rate of 18 and oxygen saturation of 100%. EKG showed sinus rhythm with sinus arrhythmia, first troponin was 21 and on recheck it was 23.  Rest of the CBC and BMP were unremarkable.  INR was 1.56.        1. Chest pain    Recent PCI, INR infra therapeutic, not taking Plavix.  Presented with chest pain and slight uptrend in troponin, will treat as NSTEMI with heparin drip.  Consulted cardiology, awaiting recommendation.    Continue Plavix, not on aspirin due to being on Coumadin.     2. History of atrial fibrillation.    Currently in sinus rhythm with sinus arrhythmia.    INR subtherapeutic at 1.56, continue heparin drip.     3. Essential hypertension.    Continue metoprolol.     4. Uncontrolled hyperlipidemia.    Continue Lipitor.  He has been prescribed Repatha but has not been taking it because he could not afford it.    Check lipid panel in the morning.     5. Depression/anxiety  Continue nortriptyline and escitalopram.    Diet:  Cardiac diet, n.p.o.   [place] Catheter: Not  present  Lines:  Peripheral IV    Cardiac Monitoring: Yes    Clinically Significant Risk Factors Present on Admission               # Coagulation Defect: INR = 1.56 (Ref range: 0.85 - 1.15) and/or PTT = N/A, will monitor for bleeding    # Hypertension: Noted on problem list             DVT Prophylaxis: Heparin GTT  Code Status: Full Code    Disposition: Expected discharge in 6/01/2023.    Interval History   Patient has been pain-free after admission.  He has been having chest pain on exertion behind the sternum radiating to his neck that went away with resting and nitroglycerin sublingual.  His INR is subtherapeutic and he was not taking Plavix (ran out)    -Data reviewed today: I reviewed all new labs and imaging results over the last 24 hours. I personally reviewed the EKG tracing showing Sinus with heart rate 60.    Physical Exam   Temp: 97.9  F (36.6  C) Temp src: Oral BP: 125/73 Pulse: 60   Resp: 18 SpO2: 95 % O2 Device: None (Room air)    Vitals:    05/30/23 2015 05/30/23 2131   Weight: 91.2 kg (201 lb 1 oz) 91.1 kg (200 lb 13.4 oz)     Vital Signs with Ranges  Temp:  [97.9  F (36.6  C)-98.3  F (36.8  C)] 97.9  F (36.6  C)  Pulse:  [] 60  Resp:  [11-18] 18  BP: (125-167)/() 125/73  SpO2:  [95 %-100 %] 95 %  No intake/output data recorded.    GEN:  Alert, oriented x 3, appears comfortable, NAD.  HEENT:  Normocephalic/atraumatic, no scleral icterus, no nasal discharge, mouth moist.  CV:  Regular rate and rhythm, no murmur or JVD.  S1 + S2 noted, no S3 or S4.  LUNGS:  Clear to auscultation bilaterally without rales/rhonchi/wheezing/retractions.  Symmetric chest rise on inhalation noted.  ABD:  Active bowel sounds, soft, non-tender/non-distended.  No rebound/guarding/rigidity.  EXT:  No edema or cyanosis.  No joint synovitis noted.  SKIN:  Dry to touch, no exanthems noted in the visualized areas.       Medications     heparin 1,100 Units/hr (05/31/23 0736)     - MEDICATION INSTRUCTIONS -       -  MEDICATION INSTRUCTIONS -       sodium chloride         aspirin  325 mg Oral Once    Or     aspirin  243 mg Oral Once     atorvastatin  40 mg Oral QPM     clopidogrel  75 mg Oral QPM     escitalopram  20 mg Oral QPM     metoprolol succinate ER  25 mg Oral QPM     nortriptyline  20-30 mg Oral At Bedtime     sodium chloride (PF)  3 mL Intracatheter Q8H     sodium chloride (PF)  3 mL Intracatheter Q8H       Data   Recent Labs   Lab 23  0648 23  2018 23  1602   WBC 5.0 6.8 7.9   HGB 14.4 14.2 14.7   MCV 89 86 86    250 257   INR  --   --  1.56*     --  139   POTASSIUM 4.2  --  3.9   CHLORIDE 104  --  104   CO2 23  --  21*   BUN 23.1*  --  24.8*   CR 0.93  --  1.12   ANIONGAP 11  --  14   NOEL 9.2  --  9.3   *  --  81       Recent Results (from the past 24 hour(s))   XR Chest 2 Views    Narrative    EXAM: XR CHEST 2 VIEWS  LOCATION: [facility]  DATE/TIME: 2023 4:42 PM CDT    INDICATION: Chest pain  COMPARISON: None.      Impression    IMPRESSION:     The cardiac silhouette is normal in size. Left coronary stent. Loop recorder in the left anterior chest wall. Hilar and mediastinal interfaces are normal.    The lungs are symmetrically inflated. There are no airspace or interstitial opacities.    Diaphragm curvature is normal. No pleural fluid is present.    Flowing degenerative osteophytes mid and lower thoracic spine. No aggressive or destructive bone lesions.      Echocardiogram Complete   Result Value    Biplane LVEF 44%    Narrative    [id number]  [id number]  [id number]  [id number]^PARIS^SHIVANI     [facility]  Echocardiography Laboratory  [address] [place] [address]     Name: JULIA CAPPS  MRN: [id number]  : 1955  Study Date: 2023 09:14 AM  Age: 68 yrs  Gender: Male  Patient Location: [facility]  Reason For Study: Chest Pain  Ordering Physician: SHIVANI TOLEDO  Referring Physician: Deo Jenkins  Performed By:  Carol Borrego     BSA: 2.1 m2  Height: 72 in  Weight: 200 lb  HR: 53  BP: 138/81 mmHg  ______________________________________________________________________________  Procedure  Complete Portable Echo Adult. Optison (NDC #[id number]) given intravenously.  ______________________________________________________________________________  Interpretation Summary     Left ventricular systolic function is mild to moderately reduced. Biplane LVEF  is 44%. Mild global hypokinesis, worse in the apical anterior, apical septal,  and apex wall segments.  Right ventricle is normal in structure, function and size.  Aortic valve morphology is not well visualized, however it is heavily  calcified. There is trace to mild aortic regurgitation.  The ascending aorta is Mildly dilated. Max diameter of the visualized portion  4.5cm.     IVC diameter <2.1 cm collapsing >50% with sniff suggests a normal RA pressure  of 3 mmHg.  ______________________________________________________________________________  Left Ventricle  Left ventricular systolic function is mild to moderately reduced. Biplane LVEF  is 44%. Grade I or early diastolic dysfunction. Mild global hypokinesis, worse  in the apical anterior, apical septal, and apex wall segments.     Right Ventricle  The right ventricle is normal in structure, function and size.     Atria  Normal left atrial size. Right atrial size is normal.     Mitral Valve  The mitral valve is normal in structure and function.     Tricuspid Valve  The tricuspid valve is not well visualized, but is grossly normal. Right  ventricular systolic pressure could not be approximated due to inadequate  tricuspid regurgitation.     Aortic Valve  Aortic valve morphology is not well visualized, however it is heavily  calcified. There is trace to mild aortic regurgitation.     Pulmonic Valve  The pulmonic valve is not well seen, but is grossly normal.     Vessels  The aortic root is not well visualized. The ascending  aorta is Mildly dilated.  Max diameter of the visualized portion 4.5cm. IVC diameter <2.1 cm collapsing  >50% with sniff suggests a normal RA pressure of 3 mmHg.     Pericardium  There is no pericardial effusion.     ______________________________________________________________________________  MMode/2D Measurements & Calculations  IVSd: 1.4 cm  LVIDd: 4.7 cm  LVIDs: 3.8 cm  LVPWd: 1.0 cm  FS: 18.9 %  LV mass(C)d: 216.3 grams  LV mass(C)dI: 101.6 grams/m2     Ao root diam: 3.6 cm  LVOT diam: 2.0 cm  LVOT area: 3.1 cm2  LA Volume (BP): 50.6 ml  LA Volume Index (BP): 23.8 ml/m2  RV Base: 3.0 cm  RWT: 0.44  TAPSE: 3.0 cm     Doppler Measurements & Calculations  MV E max wili: 50.1 cm/sec  MV A max wili: 74.6 cm/sec  MV E/A: 0.67     MV max P.6 mmHg  MV mean P.86 mmHg  MV V2 VTI: 33.0 cm  MVA(VTI): 2.1 cm2  MV dec time: 0.32 sec  Ao V2 max: 165.7 cm/sec  Ao max P.0 mmHg  Ao V2 mean: 118.7 cm/sec  Ao mean P.0 mmHg  Ao V2 VTI: 36.6 cm  GEOVANY(I,D): 1.9 cm2  GEOVANY(V,D): 1.9 cm2  LV V1 max P.0 mmHg  LV V1 max: 100.3 cm/sec  LV V1 VTI: 23.0 cm  SV(LVOT): 70.4 ml  SI(LVOT): 33.0 ml/m2  PA acc time: 0.14 sec  AV Wili Ratio (DI): 0.61  GEOVANY Index (cm2/m2): 0.90  E/E' av.3  Lateral E/e': 8.6  Medial E/e': 10.0  RV S Wili: 14.0 cm/sec     ______________________________________________________________________________  Report approved by: Yvette Pino 2023 10:44 AM               Securely message with the Vocera Web Console (learn more here)  Text page via [facility] Paging/Directory        Disclaimer: This note consists of symbols derived from keyboarding, dictation and/or voice recognition software. As a result, there may be errors in the script that have gone undetected. Please consider this when interpreting information found in this chart.

## 2023-05-31 NOTE — PROGRESS NOTES
Temp: 97.8  F (36.6  C) Temp src: Oral BP: 120/73 Pulse: 67   Resp: 18 SpO2: 97 % O2 Device: None (Room air)       Orientation:  A&O x4  VS: VSS  Pain:  Denies pain  Tele:  SR  Activity:  A1 SBA  Resp:  RA  GI:  Denies nausea and vomiting  : Voiding without difficulty  Skin:  WNL  Lines: PIV infusing  Diet: NPO  Labs:  Potassium protocol active  Plan: TBD  Discharge: TBD

## 2023-05-31 NOTE — ED NOTES
[facility]  ED Nurse Handoff Report    ED Chief complaint: Chest Pain  . ED Diagnosis:   Final diagnoses:   Chest pain, unspecified type   Elevated troponin       Allergies: No Known Allergies    Code Status: Full Code    Activity level - Baseline/Home:  independent.  Activity Level - Current:   standby.   Lift room needed: No.   Bariatric: No   Needed: No   Isolation: No.   Infection: Not Applicable.     Respiratory status: Room air    Vital Signs (within 30 minutes):   Vitals:    05/30/23 1610 05/30/23 1644 05/30/23 1741 05/30/23 1901   BP: (!) 138/102 (!) 133/100  (!) 139/92   Pulse: 89 74 69 73   Resp: 13 17 11 12   Temp:       TempSrc:       SpO2: 100% 96% 98% 98%       Cardiac Rhythm:  ,   Cardiac  Cardiac Rhythm: Normal sinus rhythm  Pain level:    Patient confused: No.   Patient Falls Risk: arm band in place.   Elimination Status: Has voided and Unable to void     Patient Report - Initial Complaint: Chest Pain.   Focused Assessment:    Gastrointestinal Gastrointestinal  Gastrointestinal Gastrointestinal WDL: WDL          1621  Neuro Cognitive  Neuro Cognitive  Neuro Cognitive (Adult) Cognitive/Neuro/Behavioral WDL: WDL   [place] Coma Scale Best Eye Response: 4-->(E4) spontaneous  Best Motor Response: 6-->(M6) obeys commands  Best Verbal Response: 5-->(V5) oriented  [place] Coma Scale Score: 15         1621  Cardiac  Cardiac  Cardiac (Adult) Cardiac WDL: .WDL except; chest pain; all  Cardiac Rhythm: NSR   Chest Pain Assessment Chest Pain Location: midsternal  Character: tightness  Precipitating Factors: activity  Alleviating Factors: rest; sitting up  Chest Pain Intervention: cardiac biomarkers drawn; cardiac monitoring continued; cardiac monitor placed; 12-lead ECG obtained; activity minimized  SH       1621  Respiratory  Respiratory  Respiratory (Adult) Airway WDL: WDL   Respiratory WDL Respiratory WDL: WDL           Abnormal Results:   Labs Ordered and Resulted from  Time of ED Arrival to Time of ED Departure   BASIC METABOLIC PANEL - Abnormal       Result Value    Sodium 139      Potassium 3.9      Chloride 104      Carbon Dioxide (CO2) 21 (*)     Anion Gap 14      Urea Nitrogen 24.8 (*)     Creatinine 1.12      Calcium 9.3      Glucose 81      GFR Estimate 71     CBC WITH PLATELETS AND DIFFERENTIAL - Abnormal    WBC Count 7.9      RBC Count 4.98      Hemoglobin 14.7      Hematocrit 43.0      MCV 86      MCH 29.5      MCHC 34.2      RDW 15.1 (*)     Platelet Count 257      % Neutrophils 69      % Lymphocytes 21      % Monocytes 8      % Eosinophils 1      % Basophils 1      % Immature Granulocytes 0      NRBCs per 100 WBC 0      Absolute Neutrophils 5.4      Absolute Lymphocytes 1.7      Absolute Monocytes 0.7      Absolute Eosinophils 0.1      Absolute Basophils 0.1      Absolute Immature Granulocytes 0.0      Absolute NRBCs 0.0     INR - Abnormal    INR 1.56 (*)    TROPONIN T, HIGH SENSITIVITY - Abnormal    Troponin T, High Sensitivity 23 (*)    TROPONIN T, HIGH SENSITIVITY - Normal    Troponin T, High Sensitivity 21          XR Chest 2 Views   Final Result   IMPRESSION:       The cardiac silhouette is normal in size. Left coronary stent. Loop recorder in the left anterior chest wall. Hilar and mediastinal interfaces are normal.      The lungs are symmetrically inflated. There are no airspace or interstitial opacities.      Diaphragm curvature is normal. No pleural fluid is present.      Flowing degenerative osteophytes mid and lower thoracic spine. No aggressive or destructive bone lesions.              Treatments provided: labs, imaging  Family Comments: family present  OBS brochure/video discussed/provided to patient:  Yes  ED Medications: Medications - No data to display    Drips infusing:  No  For the majority of the shift this patient was Green.   Interventions performed were N/A.    Sepsis treatment initiated: No    Cares/treatment/interventions/medications to be  completed following ED care: N/A    ED Nurse Name: Kristin Murillo RN  7:32 PM  RECEIVING UNIT ED HANDOFF REVIEW    Above ED Nurse Handoff Report was reviewed: Yes  Reviewed by: Sridevi Valderrama RN on May 30, 2023 at 9:06 PM

## 2023-05-31 NOTE — PHARMACY-ADMISSION MEDICATION HISTORY
Pharmacist Admission Medication History    Admission medication history is complete. The information provided in this note is only as accurate as the sources available at the time of the update.    Medication reconciliation/reorder completed by provider prior to medication history? Yes    Information Source(s): Patient and CareEverywhere/SureScripts via in-person    Pertinent Information:     Warfarin: pt states he has been taking 5mg daily the past 3 weeks (Since his last INR check). But his most recent anticoag visit on 4/22 had his warfarin regimen as 7.5mg on thursdays and 5mg all other days.     Changes made to PTA medication list:    Added: All PTA meds were added    Deleted: None    Changed: None     Allergies reviewed with patient and updates made in EHR: yes    Medication History Completed By: LEO DELA CRUZ [facility] 5/30/2023 8:57 PM    Prior to Admission medications    Medication Sig Last Dose Taking? Auth Provider Long Term End Date   acetaminophen (TYLENOL) 500 MG tablet Take 500-1,000 mg by mouth every 6 hours as needed for mild pain prn at prn Yes Unknown, Entered By History     atorvastatin (LIPITOR) 40 MG tablet Take 40 mg by mouth every evening 5/29/2023 at pm Yes Unknown, Entered By History Yes    clopidogrel (PLAVIX) 75 MG tablet Take 75 mg by mouth every evening 5/29/2023 at pm Yes Unknown, Entered By History Yes    escitalopram (LEXAPRO) 20 MG tablet Take 20 mg by mouth every evening 5/29/2023 at pm Yes Unknown, Entered By History Yes    metoprolol succinate ER (TOPROL XL) 25 MG 24 hr tablet Take 25 mg by mouth every evening 5/29/2023 at pm Yes Unknown, Entered By History Yes    nortriptyline (PAMELOR) 10 MG capsule Take 30 mg by mouth At Bedtime 5/29/2023 at hs Yes Unknown, Entered By History Yes    warfarin ANTICOAGULANT (COUMADIN) 5 MG tablet Take 5 mg by mouth every evening 5/29/2023 at pm Yes Unknown, Entered By History

## 2023-05-31 NOTE — PLAN OF CARE
Goal Outcome Evaluation:      Plan of Care Reviewed With: patient    Overall Patient Progress: improving     Assumed care at 2130. A&OX4. LS clear. VSS stable. Oxygen 95% on room air. Denied any chest pain nor chest discomfort. Heparin infusing at 1100 unit/hr. Up with SBA to bathroom. Plan is for NPO after midnight. Continue POC and monitoring/

## 2023-05-31 NOTE — PLAN OF CARE
Goal Outcome Evaluation:      Plan of Care Reviewed With: patient, spouse    Overall Patient Progress: improvingOverall Patient Progress: improving  Patient has no complaints of pain. Troponin's have been flat. Heparin continues in rt PIV. Alert, orientted, up independently. Tele is SB.  Cardiology spent a lot of time explaining multiple tests/pros/cons ect. Patient is indecisive about the Angio recommendations. Dr Sahni to talk with [place] cardiologist, if available. Message sent through Dr Alex nurse via . Continue POC.  Plan is angio today. Wife is at bedside. Supportive of patient.

## 2023-05-31 NOTE — PROGRESS NOTES
Patient brought down to cath lab holding area at 1545 in anticipation of angiogram.  Unfortunately, previous patient taking longer than expected and physician made decision to postpone until tomorrow.  Explained situation to patient and wife, both very understanding.  Patient transferred back to room 355, handoff given to RN at bedside.  Plan for angiogram tomorrow.  Madalyn Raymundo, RN

## 2023-06-01 LAB
HOLD SPECIMEN: NORMAL
INR PPP: 1.22 (ref 0.85–1.15)
MAGNESIUM SERPL-MCNC: 2 MG/DL (ref 1.7–2.3)
POTASSIUM SERPL-SCNC: 4.3 MMOL/L (ref 3.4–5.3)
UFH PPP CHRO-ACNC: 0.54 IU/ML
UFH PPP CHRO-ACNC: <0.1 IU/ML

## 2023-06-01 PROCEDURE — 93454 CORONARY ARTERY ANGIO S&I: CPT | Performed by: INTERNAL MEDICINE

## 2023-06-01 PROCEDURE — 85610 PROTHROMBIN TIME: CPT | Performed by: STUDENT IN AN ORGANIZED HEALTH CARE EDUCATION/TRAINING PROGRAM

## 2023-06-01 PROCEDURE — 99152 MOD SED SAME PHYS/QHP 5/>YRS: CPT | Performed by: INTERNAL MEDICINE

## 2023-06-01 PROCEDURE — 250N000013 HC RX MED GY IP 250 OP 250 PS 637: Performed by: INTERNAL MEDICINE

## 2023-06-01 PROCEDURE — 36415 COLL VENOUS BLD VENIPUNCTURE: CPT | Performed by: HOSPITALIST

## 2023-06-01 PROCEDURE — 85520 HEPARIN ASSAY: CPT | Performed by: STUDENT IN AN ORGANIZED HEALTH CARE EDUCATION/TRAINING PROGRAM

## 2023-06-01 PROCEDURE — 120N000001 HC R&B MED SURG/OB

## 2023-06-01 PROCEDURE — 84132 ASSAY OF SERUM POTASSIUM: CPT | Performed by: HOSPITALIST

## 2023-06-01 PROCEDURE — 272N000001 HC OR GENERAL SUPPLY STERILE: Performed by: INTERNAL MEDICINE

## 2023-06-01 PROCEDURE — 83735 ASSAY OF MAGNESIUM: CPT | Performed by: HOSPITALIST

## 2023-06-01 PROCEDURE — 93454 CORONARY ARTERY ANGIO S&I: CPT | Mod: 26 | Performed by: INTERNAL MEDICINE

## 2023-06-01 PROCEDURE — 250N000011 HC RX IP 250 OP 636: Performed by: INTERNAL MEDICINE

## 2023-06-01 PROCEDURE — 36415 COLL VENOUS BLD VENIPUNCTURE: CPT | Performed by: STUDENT IN AN ORGANIZED HEALTH CARE EDUCATION/TRAINING PROGRAM

## 2023-06-01 PROCEDURE — 250N000013 HC RX MED GY IP 250 OP 250 PS 637: Performed by: STUDENT IN AN ORGANIZED HEALTH CARE EDUCATION/TRAINING PROGRAM

## 2023-06-01 PROCEDURE — B2111ZZ FLUOROSCOPY OF MULTIPLE CORONARY ARTERIES USING LOW OSMOLAR CONTRAST: ICD-10-PCS | Performed by: INTERNAL MEDICINE

## 2023-06-01 PROCEDURE — 99232 SBSQ HOSP IP/OBS MODERATE 35: CPT | Performed by: INTERNAL MEDICINE

## 2023-06-01 PROCEDURE — 99233 SBSQ HOSP IP/OBS HIGH 50: CPT | Performed by: HOSPITALIST

## 2023-06-01 RX ORDER — LIDOCAINE HYDROCHLORIDE 10 MG/ML
INJECTION, SOLUTION EPIDURAL; INFILTRATION; INTRACAUDAL; PERINEURAL
Status: DISCONTINUED
Start: 2023-06-01 | End: 2023-06-01 | Stop reason: HOSPADM

## 2023-06-01 RX ORDER — ONDANSETRON 2 MG/ML
INJECTION INTRAMUSCULAR; INTRAVENOUS
Status: DISCONTINUED
Start: 2023-06-01 | End: 2023-06-01 | Stop reason: HOSPADM

## 2023-06-01 RX ORDER — FENTANYL CITRATE 50 UG/ML
INJECTION, SOLUTION INTRAMUSCULAR; INTRAVENOUS
Status: DISCONTINUED
Start: 2023-06-01 | End: 2023-06-01 | Stop reason: HOSPADM

## 2023-06-01 RX ORDER — WARFARIN SODIUM 5 MG/1
5 TABLET ORAL ONCE
Status: COMPLETED | OUTPATIENT
Start: 2023-06-01 | End: 2023-06-01

## 2023-06-01 RX ORDER — IOPAMIDOL 755 MG/ML
INJECTION, SOLUTION INTRAVASCULAR
Status: DISCONTINUED | OUTPATIENT
Start: 2023-06-01 | End: 2023-06-01 | Stop reason: HOSPADM

## 2023-06-01 RX ORDER — ONDANSETRON 2 MG/ML
INJECTION INTRAMUSCULAR; INTRAVENOUS
Status: DISCONTINUED | OUTPATIENT
Start: 2023-06-01 | End: 2023-06-01 | Stop reason: HOSPADM

## 2023-06-01 RX ORDER — FENTANYL CITRATE 50 UG/ML
INJECTION, SOLUTION INTRAMUSCULAR; INTRAVENOUS
Status: DISCONTINUED | OUTPATIENT
Start: 2023-06-01 | End: 2023-06-01 | Stop reason: HOSPADM

## 2023-06-01 RX ORDER — ASPIRIN 325 MG
325 TABLET ORAL ONCE
Status: COMPLETED | OUTPATIENT
Start: 2023-06-01 | End: 2023-06-01

## 2023-06-01 RX ADMIN — WARFARIN SODIUM 5 MG: 5 TABLET ORAL at 22:51

## 2023-06-01 RX ADMIN — CLOPIDOGREL BISULFATE 75 MG: 75 TABLET ORAL at 21:20

## 2023-06-01 RX ADMIN — ESCITALOPRAM OXALATE 20 MG: 10 TABLET ORAL at 21:20

## 2023-06-01 RX ADMIN — LOSARTAN POTASSIUM 25 MG: 25 TABLET, FILM COATED ORAL at 09:11

## 2023-06-01 RX ADMIN — ATORVASTATIN CALCIUM 40 MG: 40 TABLET, FILM COATED ORAL at 21:20

## 2023-06-01 RX ADMIN — NORTRIPTYLINE HYDROCHLORIDE 20 MG: 10 CAPSULE ORAL at 21:20

## 2023-06-01 RX ADMIN — ASPIRIN 325 MG ORAL TABLET 325 MG: 325 PILL ORAL at 12:47

## 2023-06-01 ASSESSMENT — ACTIVITIES OF DAILY LIVING (ADL)
ADLS_ACUITY_SCORE: 23
ADLS_ACUITY_SCORE: 25
ADLS_ACUITY_SCORE: 23

## 2023-06-01 NOTE — PLAN OF CARE
Goal Outcome Evaluation:      Plan of Care Reviewed With: patient, spouse    Overall Patient Progress: improvingOverall Patient Progress: improving  Patient pain free. Angio with no interventions. Continue POC.

## 2023-06-01 NOTE — PLAN OF CARE
Goal Outcome Evaluation:   Plan of Care reviewed with: patient    Overall patient progress: stable, progressing     Vitals: VSS   Tele: SR/SB   Pain: denies pain/discomfort at this time    Notable Events: Pt A&O x4, up ind/SBA in room. Heparin gtt running @ 1050u/hr overnight, recheck drawn @ 0500 - paused @ 0624 per protocol, due to restart @ 0725, see orders.   Plan: continue with plan of care; pending angio 6/1

## 2023-06-01 NOTE — PROGRESS NOTES
Coronary angiogram performed without complication.  Pt tolerated procedure well.  Rt. Groin site clean, dry, intact with bandaid.  No drainage or hematoma noted.  Report given to receiving nurse; Rt. Groin site assessed at bedside with RN.

## 2023-06-01 NOTE — PROGRESS NOTES
Patient Transfer Information  Patient connected to monitoring equipment on arrival: yes Vital signs monitor     Patient connected to wall oxygen on arrival: N/A    Belongings: No belongings present    Safety check completed: Yes

## 2023-06-01 NOTE — PROGRESS NOTES
[facility]    Hospitalist Progress Note    Date of Service (when I saw the patient): 06/01/2023  Provider:  Kaiser Hoyos MD   Text Page  7am - 6PM       Assessment & Plan   José Antonio Marcano is a 68 year old male admitted on 5/30/2023.     68 y.o. male with a history of CAD, uncontrolled dyslipidemia, multiple CVA (on warfarin), paroxysmal atrial fibrillation, aortic valve sclerosis who underwent staged PCI on 2/17/2023 with LUIS x 2 to LM-LAD and LM-Cx.     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  He did not have his nitroglycerin with him.  It is not associated with any nausea, vomiting, diaphoresis or shortness of breath.  He went to see his doctor and was transferred to the ER by ambulance.  Currently he is chest pain-free.     Vitals on presentation: Temperature 98.1, heart rate of 80, blood pressure 167/117, respiratory rate of 18 and oxygen saturation of 100%. EKG showed sinus rhythm with sinus arrhythmia, first troponin was 21 and on recheck it was 23.  Rest of the CBC and BMP were unremarkable.  INR was 1.56.        1. Chest pain    Recent PCI, INR infra therapeutic, not taking Plavix.  Presented with chest pain and slight uptrend in troponin, will treat as NSTEMI with heparin drip.  Consulted cardiology, awaiting recommendation.    Continue Plavix, not on aspirin due to being on Coumadin.     2. History of atrial fibrillation.    Currently in sinus rhythm with sinus arrhythmia.    INR subtherapeutic at 1.56, continue heparin drip.     3. Essential hypertension.    Continue metoprolol.     4. Uncontrolled hyperlipidemia.    Continue Lipitor.  He has been prescribed Repatha but has not been taking it because he could not afford it.    Check lipid panel in the morning.     5. Depression/anxiety  Continue nortriptyline and escitalopram.    Diet:  Cardiac diet, n.p.o.   [place] Catheter: Not  present  Lines:  Peripheral IV    Cardiac Monitoring: Yes    Clinically Significant Risk Factors Present on Admission               # Coagulation Defect: INR = 1.56 (Ref range: 0.85 - 1.15) and/or PTT = N/A, will monitor for bleeding    # Hypertension: Noted on problem list             DVT Prophylaxis: Heparin GTT  Code Status: Full Code    Disposition: Expected discharge in 6/01/2023.    Interval History    Uneventful overnight, plan for cardiac laboratory today for angiogram.  Patient has been pain-free after admission.  He has been having chest pain on exertion behind the sternum radiating to his neck that went away with resting and nitroglycerin sublingual.  His INR is subtherapeutic and he was not taking Plavix (ran out)    -Data reviewed today: I reviewed all new labs and imaging results over the last 24 hours. I personally reviewed the EKG tracing showing Sinus with heart rate 60.    Physical Exam   Temp: 97.9  F (36.6  C) Temp src: Oral BP: (Abnormal) 132/90 Pulse: 70   Resp: 20 SpO2: 97 % O2 Device: None (Room air)    Vitals:    05/30/23 2015 05/30/23 2131   Weight: 91.2 kg (201 lb 1 oz) 91.1 kg (200 lb 13.4 oz)     Vital Signs with Ranges  Temp:  [97.7  F (36.5  C)-97.9  F (36.6  C)] 97.9  F (36.6  C)  Pulse:  [54-70] 70  Resp:  [18-20] 20  BP: (111-132)/(73-90) 132/90  SpO2:  [90 %-97 %] 97 %  No intake/output data recorded.    GEN:  Alert, oriented x 3, appears comfortable, NAD.  HEENT:  Normocephalic/atraumatic, no scleral icterus, no nasal discharge, mouth moist.  CV:  Regular rate and rhythm, no murmur or JVD.  S1 + S2 noted, no S3 or S4.  LUNGS:  Clear to auscultation bilaterally without rales/rhonchi/wheezing/retractions.  Symmetric chest rise on inhalation noted.  ABD:  Active bowel sounds, soft, non-tender/non-distended.  No rebound/guarding/rigidity.  EXT:  No edema or cyanosis.  No joint synovitis noted.  SKIN:  Dry to touch, no exanthems noted in the visualized areas.       Medications      heparin 850 Units/hr (06/01/23 0923)     - MEDICATION INSTRUCTIONS -       - MEDICATION INSTRUCTIONS -       sodium chloride 50 mL/hr at 05/31/23 1440       atorvastatin  40 mg Oral QPM     clopidogrel  75 mg Oral QPM     escitalopram  20 mg Oral QPM     losartan  25 mg Oral Daily     metoprolol succinate ER  25 mg Oral QPM     nortriptyline  20-30 mg Oral At Bedtime     sodium chloride (PF)  3 mL Intracatheter Q8H     sodium chloride (PF)  3 mL Intracatheter Q8H       Data   Recent Labs   Lab 06/01/23  0506 05/31/23  1311 05/31/23  0648 05/30/23 2018 05/30/23  1602   WBC  --   --  5.0 6.8 7.9   HGB  --   --  14.4 14.2 14.7   MCV  --   --  89 86 86   PLT  --   --  229 250 257   INR  --   --   --   --  1.56*   NA  --   --  138  --  139   POTASSIUM 4.3  --  4.2  --  3.9   CHLORIDE  --   --  104  --  104   CO2  --   --  23  --  21*   BUN  --   --  23.1*  --  24.8*   CR  --   --  0.93  --  1.12   ANIONGAP  --   --  11  --  14   NOEL  --   --  9.2  --  9.3   GLC  --  84 103*  --  81       No results found for this or any previous visit (from the past 24 hour(s)).      Securely message with the Vocera Web Console (learn more here)  Text page via [employer] Paging/Directory        Disclaimer: This note consists of symbols derived from keyboarding, dictation and/or voice recognition software. As a result, there may be errors in the script that have gone undetected. Please consider this when interpreting information found in this chart.

## 2023-06-02 VITALS
HEIGHT: 72 IN | BODY MASS INDEX: 27.2 KG/M2 | HEART RATE: 73 BPM | RESPIRATION RATE: 16 BRPM | OXYGEN SATURATION: 95 % | WEIGHT: 200.84 LBS | TEMPERATURE: 97.7 F | SYSTOLIC BLOOD PRESSURE: 135 MMHG | DIASTOLIC BLOOD PRESSURE: 94 MMHG

## 2023-06-02 LAB
INR PPP: 1.15 (ref 0.85–1.15)
MAGNESIUM SERPL-MCNC: 2 MG/DL (ref 1.7–2.3)
POTASSIUM SERPL-SCNC: 4.5 MMOL/L (ref 3.4–5.3)

## 2023-06-02 PROCEDURE — 84132 ASSAY OF SERUM POTASSIUM: CPT | Performed by: HOSPITALIST

## 2023-06-02 PROCEDURE — 250N000013 HC RX MED GY IP 250 OP 250 PS 637: Performed by: INTERNAL MEDICINE

## 2023-06-02 PROCEDURE — 36415 COLL VENOUS BLD VENIPUNCTURE: CPT | Performed by: HOSPITALIST

## 2023-06-02 PROCEDURE — 83735 ASSAY OF MAGNESIUM: CPT | Performed by: HOSPITALIST

## 2023-06-02 PROCEDURE — 85610 PROTHROMBIN TIME: CPT | Performed by: INTERNAL MEDICINE

## 2023-06-02 PROCEDURE — 99239 HOSP IP/OBS DSCHRG MGMT >30: CPT | Performed by: HOSPITALIST

## 2023-06-02 PROCEDURE — 36415 COLL VENOUS BLD VENIPUNCTURE: CPT | Performed by: INTERNAL MEDICINE

## 2023-06-02 RX ORDER — WARFARIN SODIUM 5 MG/1
10 TABLET ORAL
Status: DISCONTINUED | OUTPATIENT
Start: 2023-06-02 | End: 2023-06-02 | Stop reason: HOSPADM

## 2023-06-02 RX ORDER — LOSARTAN POTASSIUM 25 MG/1
25 TABLET ORAL DAILY
Qty: 90 TABLET | Refills: 0 | Status: SHIPPED | OUTPATIENT
Start: 2023-06-03 | End: 2023-09-01

## 2023-06-02 RX ADMIN — LOSARTAN POTASSIUM 25 MG: 25 TABLET, FILM COATED ORAL at 08:20

## 2023-06-02 ASSESSMENT — ACTIVITIES OF DAILY LIVING (ADL)
ADLS_ACUITY_SCORE: 23
ADLS_ACUITY_SCORE: 23
ADLS_ACUITY_SCORE: 25
ADLS_ACUITY_SCORE: 23
ADLS_ACUITY_SCORE: 23
ADLS_ACUITY_SCORE: 25
ADLS_ACUITY_SCORE: 23

## 2023-06-02 NOTE — PLAN OF CARE
Goal Outcome Evaluation:      Plan of Care Reviewed With: patient, spouse    Overall Patient Progress: improving    A&OX4. Ls clear. VSS stable. R. Groin angio intact with dressing. No bleeding noted. Up to the bathroom with SBA. Denied any pain/ discomfort. Reg diet. Continent of B&B. Plan is for possible discharge tomorrow. Tele SR/SB/PVC's. Continue POC.

## 2023-06-02 NOTE — PLAN OF CARE
Plan of care reviewed with: patient   Overall patient progress: no change  Assumed care of patient around 11pm  Isolation: none  Orientation: A&Ox4  Pain: denies  Diet:Tolerating reg diet  Activity:Ax1 SB  LDA's: PIV SL  GI/: urinal at bedside  Skin:WDL  Resp: Stable on RA, clear LS  Tele: SB  Labs: INR 1.22  Other:VSS, pt appeared sleeping\ eyes closed. Rise and fall of chest observed   Current plan of care: anticipating discharge 6/2    /87 (BP Location: Right arm)   Pulse 65   Temp 97.7  F (36.5  C) (Oral)   Resp 18   Ht 1.829 m (6')   Wt 91.1 kg (200 lb 13.4 oz)   SpO2 96%   BMI 27.24 kg/m

## 2023-06-02 NOTE — PROGRESS NOTES
Inpatient Cardiology Consultation Progress Note:    José Antonio Marcano MRN#: [id number]   YOB: 1955 Age: 68 year old     Date of Admission: 5/30/2023  Consult indication: chest pain         Assessment and Plan:     # Chest pain, mildly elevated troponin, consistent with demand ischemia.  Now s/p coronary angiogram 6/1/2023 demonstrating stable findings from prior catheter reports, left main to LAD and left main to left circumflex artery stents are patent, stable RCA .  # HFrEF, LVEF 44% with apical hypokinesis. Euvolemic.   # PAF  # Alevism status, no blood transfusions    - Continue clopidogrel, resume warfarin  - Continue atorvastatin  - Continue metoprolol succinate, losartan  - Sublingual nitroglycerin as needed  - Reviewed general activity restrictions/precautions given recent coronary angiogram via RFA  - Discussed with his outpatient cardiologist Dr. Ochoa, her team will reach out to patient to arrange follow up  - Cardiology will sign off    Thank you for allowing our team to participate in the care of José Antonio Marcano.  Please do not hesitate to page me with any questions or concerns.     Manuel Sahin MD, [facility]  Cardiology  June 1, 2023    Voice recognition software utilized.     Moderate complexity          Interval Events:     - no acute events overnight  - Pt denies any chest pain, chest discomfort, dyspnea  - no dizziness/lightheadedness  - no bleeding events  - telemetry reviewed, no significant abnormalities  - interval labs and studies reviewed   - interval progress notes reviewed  - Pt updated on clinical course, plan for the day         Medications reviewed:     Current medications:  Current Facility-Administered Medications Ordered in Epic   Medication Dose Route Frequency Last Rate Last Admin     acetaminophen (TYLENOL) tablet 650 mg  650 mg Oral Q6H PRN        Or     acetaminophen (TYLENOL) Suppository 650 mg  650 mg Rectal Q6H PRN         atorvastatin  (LIPITOR) tablet 40 mg  40 mg Oral QPM   40 mg at 05/31/23 2006     clopidogrel (PLAVIX) tablet 75 mg  75 mg Oral QPM   75 mg at 05/31/23 2006     escitalopram (LEXAPRO) tablet 20 mg  20 mg Oral QPM   20 mg at 05/31/23 2006     HYDROmorphone (DILAUDID) half-tab 1 mg  1 mg Oral Q4H PRN         HYDROmorphone (DILAUDID) injection 0.2 mg  0.2 mg Intravenous Q2H PRN         lidocaine (LMX4) cream   Topical Q1H PRN         lidocaine 1 % 0.1-1 mL  0.1-1 mL Other Q1H PRN         losartan (COZAAR) tablet 25 mg  25 mg Oral Daily   25 mg at 06/01/23 0911     melatonin tablet 1 mg  1 mg Oral At Bedtime PRN         metoprolol succinate ER (TOPROL XL) 24 hr tablet 25 mg  25 mg Oral QPM   25 mg at 05/31/23 0108     naloxone (NARCAN) injection 0.2 mg  0.2 mg Intravenous Q2 Min PRN        Or     naloxone (NARCAN) injection 0.4 mg  0.4 mg Intravenous Q2 Min PRN        Or     naloxone (NARCAN) injection 0.2 mg  0.2 mg Intramuscular Q2 Min PRN        Or     naloxone (NARCAN) injection 0.4 mg  0.4 mg Intramuscular Q2 Min PRN         nortriptyline (PAMELOR) capsule 20-30 mg  20-30 mg Oral At Bedtime   20 mg at 05/31/23 2006     ondansetron (ZOFRAN ODT) ODT tab 4 mg  4 mg Oral Q6H PRN        Or     ondansetron (ZOFRAN) injection 4 mg  4 mg Intravenous Q6H PRN         Patient is already receiving anticoagulation with heparin, enoxaparin (LOVENOX), warfarin (COUMADIN)  or other anticoagulant medication   Does not apply Continuous PRN         prochlorperazine (COMPAZINE) injection 5 mg  5 mg Intravenous Q6H PRN        Or     prochlorperazine (COMPAZINE) tablet 5 mg  5 mg Oral Q6H PRN        Or     prochlorperazine (COMPAZINE) suppository 12.5 mg  12.5 mg Rectal Q12H PRN         senna-docusate (SENOKOT-S/PERICOLACE) 8.6-50 MG per tablet 1 tablet  1 tablet Oral BID PRN        Or     senna-docusate (SENOKOT-S/PERICOLACE) 8.6-50 MG per tablet 2 tablet  2 tablet Oral BID PRN         sodium chloride (PF) 0.9% PF flush 3 mL  3 mL Intracatheter Q8H    3 mL at 23 1644     No current [facility]-ordered outpatient medications on file.             Physical Exam:   Vital signs were reviewed:  Temperatures:  Current - Temp: 97.7  F (36.5  C); Max - Temp  Av.6  F (36.4  C)  Min: 97.2  F (36.2  C)  Max: 97.9  F (36.6  C)  Respiration range: Resp  Av  Min: 16  Max: 20  Pulse range: Pulse  Av.9  Min: 54  Max: 84  Blood pressure range: Systolic (24hrs), Av , Min:110 , Max:137   ; Diastolic (24hrs), Av, Min:69, Max:96    Pulse oximetry range: SpO2  Av %  Min: 90 %  Max: 99 %    Intake/Output Summary (Last 24 hours) at 2023 194  Last data filed at 2023 1836  Gross per 24 hour   Intake 480 ml   Output 300 ml   Net 180 ml     200 lbs 13.42 oz  Body mass index is 27.24 kg/m .   Body surface area is 2.15 meters squared.    General/Constitutional: appears stated age, in no apparent distress, appears to be well nourished  Head: normocephalic, atraumatic  Eyes - No scleral icterus  Neck: supple, trachea midline, no bruit bilaterally   Respiratory: clear to auscultation bilaterally, no wheezes, no rales, no increased work of breathing  Cardiovascular: JVP normal, regular rate, regular rhythm, normal S1 and S2, no S3, S4, no murmur appreciated, no lower extremity edema. Right groin site C/D/I no hematoma   Gastrointestinal: no guarding, non-rigid   Neurologic: awake, alert, moves all extremities  Skin: no jaundice, warm on limited exam  Psychiatric: affect is normal, answers questions appropriately, oriented to self and place         Selected laboratory tests:   Laboratory test results personally reviewed:   [facility]  Recent Labs   Lab 23  0506 23  1311 23  0648 23  1828 23  1602   NA  --   --  138  --  139   POTASSIUM 4.3  --  4.2  --  3.9   CHLORIDE  --   --  104  --  104   CO2  --   --  23  --  21*   ANIONGAP  --   --  11  --  14   GLC  --  84 103*  --  81   BUN  --   --  23.1*  --  24.8*   CR  --   --  0.93  --  1.12    GFRESTIMATED  --   --  88  --  71   NOEL  --   --  9.2  --  9.3   MAG 2.0  --  2.1 2.0  --      CBC  Recent Labs   Lab 05/31/23  0648 05/30/23  2018 05/30/23  1602   WBC 5.0 6.8 7.9   RBC 4.86 4.83 4.98   HGB 14.4 14.2 14.7   HCT 43.2 41.6 43.0   MCV 89 86 86   MCH 29.6 29.4 29.5   MCHC 33.3 34.1 34.2   RDW 15.5* 15.2* 15.1*    250 257     INR  Recent Labs   Lab 05/30/23  1602   INR 1.56*     No results found for: TROPI, TROPONIN, TROPR, TROPN  Recent Labs   Lab Test 05/31/23  0648   CHOL 287*   HDL 68   *   TRIG 105     No results found for: A1C  No results found for: TSH

## 2023-06-02 NOTE — PLAN OF CARE
A/ox4. IV removed. Tele removed. Discharge instructions gone over and understanding verbalized. To  meds from chosen pharmacy. Wife to transport home.

## 2023-06-02 NOTE — DISCHARGE SUMMARY
[facility]    Discharge Summary  Hospitalist    Date of Admission:  5/30/2023  Date of Discharge:  No discharge date for patient encounter.  Provider:  Kaiser Hoyos MD  Date of Service (when I last saw the patient): 06/02/23    Discharge Diagnoses   Chest pain/elevated troponin secondary to demand supply ischemia.  Coronary angiogram has proven stents patency.  History of PAF.  Essential hypertension.  Hyperlipidemia  Depression/anxiety.  Chronic warfarin anticoagulation.  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  Please see the admission history and physical for full details.    Hospital Course   José Antonio Marcano is a 68 year old male admitted on 5/30/2023. 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  He did not have his nitroglycerin with him.  It was not associated with any nausea, vomiting, diaphoresis or shortness of breath.  He went to see his doctor and was transferred to the ER by ambulance.  Arrived chest pain-free.  PMH of CAD, uncontrolled dyslipidemia, multiple CVA (on warfarin but subtherapeutic), paroxysmal atrial fibrillation, aortic valve sclerosis who underwent staged PCI on 2/17/2023 with LUIS x 2 to LM-LAD and LM-Cx.     Vitals on presentation: Temperature 98.1, heart rate of 80, blood pressure 167/117, respiratory rate of 18 and oxygen saturation of 100%. EKG showed sinus rhythm with sinus arrhythmia, first troponin was 21 and on recheck it was 23.  Rest of the CBC and BMP were unremarkable.  INR was 1.56.    Underwent coronary  angiogram that shows patency of the stents. He has being  discharge to home by Cardiology with recommendation to follow up with his PCP and Cardiologist. He will continue Plavix, Metoprolol and Warfarin. Losartan added on discharge.      1. Chest pain/demand supply ischemia     Recent PCI, INR infra therapeutic, not taking Plavix.  Presented with chest pain and slight uptrend in troponin, will treat as NSTEMI with heparin drip.  Consulted cardiology, awaiting recommendation.    Continue Plavix, not on aspirin due to being on Coumadin.     2. History of atrial fibrillation.    Currently in sinus rhythm with sinus arrhythmia.    INR subtherapeutic at 1.56, continue heparin drip.     3. Essential hypertension.    Continue metoprolol.     4. Uncontrolled hyperlipidemia.    Continue Lipitor.  He has been prescribed Repatha but has not been taking it because he could not afford it.    Check lipid panel in the morning.     5. Depression/anxiety  Continue nortriptyline and escitalopram.       # Discharge Pain Plan:    - Patient currently has NO PAIN and is not being prescribed pain medications on discharge.      Significant Results and Procedures   See below     Pending Results      Unresulted Labs Ordered in the Past 30 Days of this Admission     No orders found from 4/30/2023 to 5/31/2023.          Code Status   Full Code       Primary Care Physician   [facility]    GEN:  Alert, oriented x 3, appears comfortable, NAD.  HEENT:  Normocephalic/atraumatic, no scleral icterus, no nasal discharge, mouth moist.  CV:  Regular rate and rhythm, no murmur or JVD.  S1 + S2 noted, no S3 or S4.  LUNGS:  Clear to auscultation bilaterally without rales/rhonchi/wheezing/retractions.  Symmetric chest rise on inhalation noted.  ABD:  Active bowel sounds, soft, non-tender/non-distended.  No rebound/guarding/rigidity.  EXT:  No edema or cyanosis.  No joint synovitis noted.  SKIN:  Dry to touch, no exanthems noted in the visualized areas.     Discharge Disposition   Discharged to  home    Consultations This Hospital Stay   PHARMACY IP CONSULT  CARDIOLOGY IP CONSULT  PHARMACY TO DOSE WARFARIN    Time Spent on this Encounter   I, Kaiser Hoyos MD, personally saw the patient today and spent greater than 30 minutes discharging this patient.     Discharge Orders   No discharge procedures on file.  Discharge Medications   Current Discharge Medication List      START taking these medications    Details   losartan (COZAAR) 25 MG tablet Take 1 tablet (25 mg) by mouth daily for 90 days  Qty: 90 tablet, Refills: 0    Associated Diagnoses: NSTEMI (non-ST elevated myocardial infarction) (H); Hypertension, unspecified type         CONTINUE these medications which have NOT CHANGED    Details   acetaminophen (TYLENOL) 500 MG tablet Take 500-1,000 mg by mouth every 6 hours as needed for mild pain      atorvastatin (LIPITOR) 40 MG tablet Take 40 mg by mouth every evening      clopidogrel (PLAVIX) 75 MG tablet Take 75 mg by mouth every evening      escitalopram (LEXAPRO) 20 MG tablet Take 20 mg by mouth every evening      metoprolol succinate ER (TOPROL XL) 25 MG 24 hr tablet Take 25 mg by mouth every evening      nortriptyline (PAMELOR) 10 MG capsule Take 30 mg by mouth At Bedtime      warfarin ANTICOAGULANT (COUMADIN) 5 MG tablet Take 5 mg by mouth every evening           Allergies   No Known Allergies  Data   Most Recent 3 CBC's:Recent Labs   Lab Test 05/31/23  0648 05/30/23 2018 05/30/23  1602   WBC 5.0 6.8 7.9   HGB 14.4 14.2 14.7   MCV 89 86 86    250 257      Most Recent 3 BMP's:  Recent Labs   Lab Test 06/02/23  0932 06/01/23  0506 05/31/23  1311 05/31/23  0648 05/30/23  1602   NA  --   --   --  138 139   POTASSIUM 4.5 4.3  --  4.2 3.9   CHLORIDE  --   --   --  104 104   CO2  --   --   --  23 21*   BUN  --   --   --  23.1* 24.8*   CR  --   --   --  0.93 1.12   ANIONGAP  --   --   --  11 14   NOEL  --   --   --  9.2 9.3   GLC  --   --  84 103* 81     Most Recent 2 LFT's:No lab results  found.  Most Recent INR's and Anticoagulation Dosing History:  Anticoagulation Dose History        Row Labels Latest Ref Rng & Units 2023   Recent Dosing and Labs   Section Header. No data exists in this row.       warfarin ANTICOAGULANT (COUMADIN) tablet 5 mg     5 mg, $Given    INR   0.85 - 1.15 1.56   1.22   1.15                Most Recent 3 Troponin's:No lab results found.  Most Recent Cholesterol Panel:  Recent Labs   Lab Test 23  0648   CHOL 287*   *   HDL 68   TRIG 105     Most Recent 6 Bacteria Isolates From Any Culture (See EPIC Reports for Culture Details):No lab results found.  Most Recent TSH, T4 and A1c Labs:No lab results found.  Results for orders placed or performed during the hospital encounter of 23   XR Chest 2 Views    Narrative    EXAM: XR CHEST 2 VIEWS  LOCATION: [facility]  DATE/TIME: 2023 4:42 PM CDT    INDICATION: Chest pain  COMPARISON: None.      Impression    IMPRESSION:     The cardiac silhouette is normal in size. Left coronary stent. Loop recorder in the left anterior chest wall. Hilar and mediastinal interfaces are normal.    The lungs are symmetrically inflated. There are no airspace or interstitial opacities.    Diaphragm curvature is normal. No pleural fluid is present.    Flowing degenerative osteophytes mid and lower thoracic spine. No aggressive or destructive bone lesions.      Echocardiogram Complete     Value    Biplane LVEF 44%    Narrative    [id number]  [id number]  [id number]  [id number]^PARIS^SHIVANI     [facility]  Echocardiography Laboratory  [address] [place] [address]     Name: JULIA CAPPS  MRN: [id number]  : 1955  Study Date: 2023 09:14 AM  Age: 68 yrs  Gender: Male  Patient Location: [facility]  Reason For Study: Chest Pain  Ordering Physician: SHIVANI TOLEDO  Referring Physician: Deo Jenkins  Performed By: Carol Borrego     BSA: 2.1 m2  Height: 72  in  Weight: 200 lb  HR: 53  BP: 138/81 mmHg  ______________________________________________________________________________  Procedure  Complete Portable Echo Adult. Optison (NDC #[id number]) given intravenously.  ______________________________________________________________________________  Interpretation Summary     Left ventricular systolic function is mild to moderately reduced. Biplane LVEF  is 44%. Mild global hypokinesis, worse in the apical anterior, apical septal,  and apex wall segments.  Right ventricle is normal in structure, function and size.  Aortic valve morphology is not well visualized, however it is heavily  calcified. There is trace to mild aortic regurgitation.  The ascending aorta is Mildly dilated. Max diameter of the visualized portion  4.5cm.     IVC diameter <2.1 cm collapsing >50% with sniff suggests a normal RA pressure  of 3 mmHg.  ______________________________________________________________________________  Left Ventricle  Left ventricular systolic function is mild to moderately reduced. Biplane LVEF  is 44%. Grade I or early diastolic dysfunction. Mild global hypokinesis, worse  in the apical anterior, apical septal, and apex wall segments.     Right Ventricle  The right ventricle is normal in structure, function and size.     Atria  Normal left atrial size. Right atrial size is normal.     Mitral Valve  The mitral valve is normal in structure and function.     Tricuspid Valve  The tricuspid valve is not well visualized, but is grossly normal. Right  ventricular systolic pressure could not be approximated due to inadequate  tricuspid regurgitation.     Aortic Valve  Aortic valve morphology is not well visualized, however it is heavily  calcified. There is trace to mild aortic regurgitation.     Pulmonic Valve  The pulmonic valve is not well seen, but is grossly normal.     Vessels  The aortic root is not well visualized. The ascending aorta is Mildly dilated.  Max diameter of the  visualized portion 4.5cm. IVC diameter <2.1 cm collapsing  >50% with sniff suggests a normal RA pressure of 3 mmHg.     Pericardium  There is no pericardial effusion.     ______________________________________________________________________________  MMode/2D Measurements & Calculations  IVSd: 1.4 cm  LVIDd: 4.7 cm  LVIDs: 3.8 cm  LVPWd: 1.0 cm  FS: 18.9 %  LV mass(C)d: 216.3 grams  LV mass(C)dI: 101.6 grams/m2     Ao root diam: 3.6 cm  LVOT diam: 2.0 cm  LVOT area: 3.1 cm2  LA Volume (BP): 50.6 ml  LA Volume Index (BP): 23.8 ml/m2  RV Base: 3.0 cm  RWT: 0.44  TAPSE: 3.0 cm     Doppler Measurements & Calculations  MV E max wili: 50.1 cm/sec  MV A max wili: 74.6 cm/sec  MV E/A: 0.67     MV max P.6 mmHg  MV mean P.86 mmHg  MV V2 VTI: 33.0 cm  MVA(VTI): 2.1 cm2  MV dec time: 0.32 sec  Ao V2 max: 165.7 cm/sec  Ao max P.0 mmHg  Ao V2 mean: 118.7 cm/sec  Ao mean P.0 mmHg  Ao V2 VTI: 36.6 cm  GEOVANY(I,D): 1.9 cm2  GEOVANY(V,D): 1.9 cm2  LV V1 max P.0 mmHg  LV V1 max: 100.3 cm/sec  LV V1 VTI: 23.0 cm  SV(LVOT): 70.4 ml  SI(LVOT): 33.0 ml/m2  PA acc time: 0.14 sec  AV Wili Ratio (DI): 0.61  GEOVANY Index (cm2/m2): 0.90  E/E' av.3  Lateral E/e': 8.6  Medial E/e': 10.0  RV S Wili: 14.0 cm/sec     ______________________________________________________________________________  Report approved by: Yvette Pino 2023 10:44 AM         Cardiac Catheterization    Narrative       Prox RCA lesion is 100% stenosed.     Mid LAD lesion is 30% stenosed.     Widely patent LM, LAD, and LCX stents     of RCA very reasonable to attempt if persisting angina despite medical   management       Disclaimer: This note consists of symbols derived from keyboarding, dictation and/or voice recognition software. As a result, there may be errors in the script that have gone undetected. Please consider this when interpreting information found in this chart.

## 2023-06-02 NOTE — PHARMACY-ANTICOAGULATION SERVICE
Clinical Pharmacy- Warfarin Discharge Note  This patient is currently on warfarin for the treatment of Atrial fibrillation.  INR Goal= 2-3  Expected length of therapy lifetime.    Warfarin PTA Regimen: 5mg daily      Anticoagulation Dose History         Latest Ref Rng & Units 5/30/2023 6/1/2023 6/2/2023   Recent Dosing and Labs   warfarin ANTICOAGULANT (COUMADIN) tablet 5 mg   5 mg, $Given    INR 0.85 - 1.15 1.56   1.22   1.15                Reviewed PTA, inpt and discharge meds.  Medication changes made unlikely to sig interact w/warfarin/INR.  Agree w/MD plan for 10mg today, 5mg sat and 2.5mg sun with INR Monday.  MD aware INR subtherapeutic on discharge.

## 2023-06-02 NOTE — PHARMACY-ANTICOAGULATION SERVICE
Clinical Pharmacy - Warfarin Dosing Consult     Pharmacy has been consulted to manage this patient s warfarin therapy.  Indication: Atrial Fibrillation  Therapy Goal: INR 2-3  Warfarin Prior to Admission: Yes  Warfarin PTA Regimen: 5mg daily  Dose Comments: INR=1.22    INR   Date Value Ref Range Status   06/01/2023 1.22 (H) 0.85 - 1.15 Final   05/30/2023 1.56 (H) 0.85 - 1.15 Final       Recommend warfarin 5 mg today.  Pharmacy will monitor José Antonio Marcano daily and order warfarin doses to achieve specified goal.      Please contact pharmacy as soon as possible if the warfarin needs to be held for a procedure or if the warfarin goals change.

## 2024-08-30 NOTE — PROGRESS NOTES
Patient Transfer Information  Patient connected to monitoring equipment on arrival: yes Vital signs monitor     Patient connected to wall oxygen on arrival: N/A    Belongings: Transferred with patient    Safety check completed: Yes       Yes

## 2024-10-24 NOTE — Clinical Note
----- Message from Jasbir sent at 10/24/2024  3:04 PM CDT -----  Regarding: Consult/Advisory/Results  Contact: [phone]  Consult/Advisory/Results      Name Of Caller: pt         Contact Preference:   [phone]     Nature of call: Would like to go over recent fiberoscan results. Please call to advise   Procedure is scheduled in [place] [id number].
